# Patient Record
Sex: FEMALE | Race: BLACK OR AFRICAN AMERICAN | NOT HISPANIC OR LATINO | Employment: UNEMPLOYED | URBAN - METROPOLITAN AREA
[De-identification: names, ages, dates, MRNs, and addresses within clinical notes are randomized per-mention and may not be internally consistent; named-entity substitution may affect disease eponyms.]

---

## 2017-01-18 ENCOUNTER — HISTORICAL (OUTPATIENT)
Dept: ADMINISTRATIVE | Facility: HOSPITAL | Age: 47
End: 2017-01-18

## 2018-12-17 ENCOUNTER — HISTORICAL (OUTPATIENT)
Dept: GASTROENTEROLOGY | Facility: CLINIC | Age: 48
End: 2018-12-17

## 2018-12-17 LAB
ABS NEUT (OLG): 2.09 X10(3)/MCL (ref 2.1–9.2)
ALBUMIN SERPL-MCNC: 4 GM/DL (ref 3.4–5)
ALBUMIN/GLOB SERPL: 1 RATIO (ref 1–2)
ALP SERPL-CCNC: 141 UNIT/L (ref 45–117)
ALT SERPL-CCNC: 141 UNIT/L (ref 12–78)
AST SERPL-CCNC: 57 UNIT/L (ref 15–37)
BASOPHILS # BLD AUTO: 0.01 X10(3)/MCL
BASOPHILS NFR BLD AUTO: 0 %
BILIRUB SERPL-MCNC: 0.6 MG/DL (ref 0.2–1)
BILIRUBIN DIRECT+TOT PNL SERPL-MCNC: 0.2 MG/DL
BILIRUBIN DIRECT+TOT PNL SERPL-MCNC: 0.4 MG/DL
BUN SERPL-MCNC: 15 MG/DL (ref 7–18)
CALCIUM SERPL-MCNC: 9.3 MG/DL (ref 8.5–10.1)
CHLORIDE SERPL-SCNC: 101 MMOL/L (ref 98–107)
CO2 SERPL-SCNC: 32 MMOL/L (ref 21–32)
CREAT SERPL-MCNC: 0.9 MG/DL (ref 0.6–1.3)
EOSINOPHIL # BLD AUTO: 0.11 10*3/UL
EOSINOPHIL NFR BLD AUTO: 2 %
ERYTHROCYTE [DISTWIDTH] IN BLOOD BY AUTOMATED COUNT: 11.6 % (ref 11.5–14.5)
GLOBULIN SER-MCNC: 3.6 GM/ML (ref 2.3–3.5)
GLUCOSE SERPL-MCNC: 335 MG/DL (ref 74–106)
HCT VFR BLD AUTO: 40.6 % (ref 35–46)
HGB BLD-MCNC: 13.4 GM/DL (ref 12–16)
LYMPHOCYTES # BLD AUTO: 1.91 X10(3)/MCL
LYMPHOCYTES NFR BLD AUTO: 42 % (ref 13–40)
MCH RBC QN AUTO: 31.8 PG (ref 26–34)
MCHC RBC AUTO-ENTMCNC: 33 GM/DL (ref 31–37)
MCV RBC AUTO: 96.2 FL (ref 80–100)
MONOCYTES # BLD AUTO: 0.39 X10(3)/MCL
MONOCYTES NFR BLD AUTO: 9 % (ref 4–12)
NEUTROPHILS # BLD AUTO: 2.09 X10(3)/MCL
NEUTROPHILS NFR BLD AUTO: 46 X10(3)/MCL
PLATELET # BLD AUTO: 203 X10(3)/MCL (ref 130–400)
PMV BLD AUTO: 9.7 FL (ref 7.4–10.4)
POTASSIUM SERPL-SCNC: 4 MMOL/L (ref 3.5–5.1)
PREALB SERPL-MCNC: 22.9 MG/DL (ref 20–40)
PROT SERPL-MCNC: 7.6 GM/DL (ref 6.4–8.2)
RBC # BLD AUTO: 4.22 X10(6)/MCL (ref 4–5.2)
SODIUM SERPL-SCNC: 137 MMOL/L (ref 136–145)
WBC # SPEC AUTO: 4.5 X10(3)/MCL (ref 4.5–11)

## 2019-01-02 ENCOUNTER — HISTORICAL (OUTPATIENT)
Dept: ENDOSCOPY | Facility: HOSPITAL | Age: 49
End: 2019-01-02

## 2022-04-07 ENCOUNTER — HISTORICAL (OUTPATIENT)
Dept: ADMINISTRATIVE | Facility: HOSPITAL | Age: 52
End: 2022-04-07

## 2022-04-23 VITALS
WEIGHT: 115.06 LBS | SYSTOLIC BLOOD PRESSURE: 130 MMHG | BODY MASS INDEX: 19.64 KG/M2 | DIASTOLIC BLOOD PRESSURE: 88 MMHG | HEIGHT: 64 IN

## 2022-05-01 NOTE — HISTORICAL [facility]
This is a historical note converted from Rajinder. Formatting and pictures may have been removed.  Please reference Rajinder for original formatting and attached multimedia. History of Present Illness  48F with recent weight loss ~60lbs over the last 1 year with associated nausea and decreased appetite. She denies any difficulties with daily NSAID use, swallowing food, emesis or regurgitation, changes in bowel habits, blood in her stools or dark stools. She has a surgical history of  x2, hysterectomy, hernia repair, gallbladder removal, treatment of bleeding gastric ulcer. She denies any tobacco or alcohol use.  Review of Systems  10 point ROS is negative if not otherwise stated in the HPI  Physical Exam  Vitals & Measurements  T:?37? ?C (Oral)? HR:?84(Monitored)? RR:?20? BP:?145/95? SpO2:?100%? WT:?53.3?kg?  NAD, A&Ox3  No inc WOB, no chest wall tenderness, equal rise and fall  Abd soft, non distended, non tender, no masses  Ext with palpable distal pulses, 5/5 strength and full ROM  Assessment/Plan  48F with unexplained weight loss  ?  -EGD/Colonoscopy today, risks and benefits discussed with patient  ?  Nick Jere PGY-2  General Surgery   Problem List/Past Medical History  Ongoing  Abnormal uterine bleeding (AUB)  DM (diabetes mellitus)  HPV in female  HTN (hypertension)  Historical  Anxiety  Depression  Depression  Diabetes  H/O gastric ulcer  Hiatal hernia  HTN (hypertension)  Mitral valve prolapse  Medications  Inpatient  Lidocaine Viscous 2% mucous membrane solution, 15 mL/EA, N/A, Once  [id number] 1,000 mL, 1000 mL, IV  Home  ALPRAZOLAM 1 MG TABLET, 1 mg= 1 tab(s), Oral, TID  Amaryl 4 mg oral tablet, 4 mg= 1 tab(s), Oral, Daily  AMLODIPINE BESYLATE 10 MG TAB, 10 mg= 1 tab(s), Oral, Daily  Dok 100 Mg Softgel, 100 mg= 1 cap(s), Oral, BID  DULOXETINE HCL DR 60 MG CAP, 60 mg= 1 cap(s), Oral, Daily  metformin 500 mg oral tablet, 1000 mg= 2 tab(s), Oral, BID  TRAMADOL HCL 50 MG TABLET, 50 mg= 1 tab(s), Oral,  BID  traZODONE 150 mg oral tab ( Desyrel ), 150 mg= 1 tab(s), Oral, qPM  Allergies  BuSpar  TEGretol  codeine?(hives)  Social History  Alcohol - Denies Alcohol Use, 10/13/2014  Employment/School  applying for disability, Highest education level: High school. Operates hazardous equipment: No., 02/28/2015  Exercise  Self assessment: Poor condition., 02/28/2015  Home/Environment  Lives with Children. Living situation: Home/Independent. Home equipment: Glucose monitoring. Alcohol abuse in household: No. Substance abuse in household: No. Smoker in household: No. Injuries/Abuse/Neglect in household: No. Feels unsafe at home: No. Family/Friends available for support: Yes. Concern for family members at home: No. Financial concerns: Yes. TV/Computer concerns: No., 02/28/2015  Nutrition/Health  Regular, Caffeine intake amount: mostly caffeine free. Wants to lose weight: Yes. Sleeping concerns: Yes. Feels highly stressed: Yes., 02/28/2015  Sexual  Substance Abuse - Denies Substance Abuse, 10/13/2014  Current, Prescription medications, Daily, Previous treatment: None. IV drug use: Yes. Drug use interferes with work/home: Yes. Ready to change: Yes. Household substance abuse concerns: No., 01/13/2015  Tobacco - Denies Tobacco Use, 10/13/2014  Never (less than 100 in lifetime), N/A, 01/02/2019  Family History  Colon cancer.: Grandmother.  DM (diabetes mellitus): Mother.  HTN (hypertension).: Mother and Father.  Tobacco user: Mother and Father.Negative: Brother.      Seen in clinic for weight loss and elevated Ca 19-9.? CT with contrast unrevealing.? Pancreas looked ok.? EGD and colonoscopy planned to evaluate for other sources of weight loss.? her weight more recently has been stable.? She still has a fair appetite.

## 2022-08-18 ENCOUNTER — HOSPITAL ENCOUNTER (EMERGENCY)
Facility: HOSPITAL | Age: 52
Discharge: HOME | End: 2022-08-18
Attending: EMERGENCY MEDICINE
Payer: MEDICAID

## 2022-08-18 VITALS
OXYGEN SATURATION: 98 % | RESPIRATION RATE: 18 BRPM | BODY MASS INDEX: 27.83 KG/M2 | HEART RATE: 94 BPM | TEMPERATURE: 99 F | HEIGHT: 64 IN | SYSTOLIC BLOOD PRESSURE: 140 MMHG | DIASTOLIC BLOOD PRESSURE: 82 MMHG | WEIGHT: 163 LBS

## 2022-08-18 DIAGNOSIS — R20.2 PARESTHESIAS IN RIGHT HAND: ICD-10-CM

## 2022-08-18 DIAGNOSIS — D70.8 OTHER NEUTROPENIA: Primary | ICD-10-CM

## 2022-08-18 DIAGNOSIS — M79.601 BILATERAL ARM PAIN: ICD-10-CM

## 2022-08-18 DIAGNOSIS — M79.602 BILATERAL ARM PAIN: ICD-10-CM

## 2022-08-18 DIAGNOSIS — R29.898 UPPER EXTREMITY WEAKNESS: ICD-10-CM

## 2022-08-18 DIAGNOSIS — D70.9 NEUTROPENIA, UNSPECIFIED TYPE: ICD-10-CM

## 2022-08-18 LAB
ABS NEUT CALC (OHS): 1.04 X10(3)/MCL (ref 2.1–9.2)
ALBUMIN SERPL-MCNC: 4.4 GM/DL (ref 3.5–5)
ALBUMIN/GLOB SERPL: 1.4 RATIO (ref 1.1–2)
ALP SERPL-CCNC: 66 UNIT/L (ref 40–150)
ALT SERPL-CCNC: 29 UNIT/L (ref 0–55)
AST SERPL-CCNC: 31 UNIT/L (ref 5–34)
BILIRUBIN DIRECT+TOT PNL SERPL-MCNC: 0.6 MG/DL
BUN SERPL-MCNC: 8.6 MG/DL (ref 9.8–20.1)
CALCIUM SERPL-MCNC: 9.8 MG/DL (ref 8.4–10.2)
CHLORIDE SERPL-SCNC: 104 MMOL/L (ref 98–107)
CO2 SERPL-SCNC: 28 MMOL/L (ref 22–29)
CREAT SERPL-MCNC: 0.73 MG/DL (ref 0.55–1.02)
CRP SERPL HS-MCNC: <0.3 MG/L
ERYTHROCYTE [DISTWIDTH] IN BLOOD BY AUTOMATED COUNT: 12.1 % (ref 11.5–17)
ERYTHROCYTE [SEDIMENTATION RATE] IN BLOOD: 6 MM/HR (ref 0–20)
FLUAV AG UPPER RESP QL IA.RAPID: NOT DETECTED
FLUBV AG UPPER RESP QL IA.RAPID: NOT DETECTED
GFR SERPLBLD CREATININE-BSD FMLA CKD-EPI: >60 MLS/MIN/1.73/M2
GLOBULIN SER-MCNC: 3.1 GM/DL (ref 2.4–3.5)
GLUCOSE SERPL-MCNC: 87 MG/DL (ref 74–100)
HCT VFR BLD AUTO: 38.5 % (ref 37–47)
HGB BLD-MCNC: 12.5 GM/DL (ref 12–16)
HIV 1+2 AB+HIV1 P24 AG SERPL QL IA: NONREACTIVE
HYPOCHROMIA BLD QL SMEAR: ABNORMAL
IMM GRANULOCYTES # BLD AUTO: 0.01 X10(3)/MCL (ref 0–0.04)
IMM GRANULOCYTES NFR BLD AUTO: 0.3 %
LYMPHOCYTES NFR BLD MANUAL: 2.4 X10(3)/MCL
LYMPHOCYTES NFR BLD MANUAL: 60 % (ref 13–40)
MCH RBC QN AUTO: 30.9 PG (ref 27–31)
MCHC RBC AUTO-ENTMCNC: 32.5 MG/DL (ref 33–36)
MCV RBC AUTO: 95.1 FL (ref 80–94)
MONOCYTES NFR BLD MANUAL: 0.56 X10(3)/MCL (ref 0.1–1.3)
MONOCYTES NFR BLD MANUAL: 14 % (ref 2–11)
NEUTROPHILS NFR BLD MANUAL: 26 % (ref 47–80)
NRBC BLD AUTO-RTO: 0 %
PLATELET # BLD AUTO: 193 X10(3)/MCL (ref 130–400)
PLATELET # BLD EST: ADEQUATE 10*3/UL
PMV BLD AUTO: 10.1 FL (ref 7.4–10.4)
POTASSIUM SERPL-SCNC: 3.3 MMOL/L (ref 3.5–5.1)
PROT SERPL-MCNC: 7.5 GM/DL (ref 6.4–8.3)
RBC # BLD AUTO: 4.05 X10(6)/MCL (ref 4.2–5.4)
RBC MORPH BLD: ABNORMAL
SARS-COV-2 RNA RESP QL NAA+PROBE: NOT DETECTED
SODIUM SERPL-SCNC: 142 MMOL/L (ref 136–145)
WBC # SPEC AUTO: 4 X10(3)/MCL (ref 4.5–11.5)

## 2022-08-18 PROCEDURE — 99285 EMERGENCY DEPT VISIT HI MDM: CPT | Mod: 25

## 2022-08-18 PROCEDURE — 86141 C-REACTIVE PROTEIN HS: CPT | Performed by: EMERGENCY MEDICINE

## 2022-08-18 PROCEDURE — 87389 HIV-1 AG W/HIV-1&-2 AB AG IA: CPT | Performed by: EMERGENCY MEDICINE

## 2022-08-18 PROCEDURE — 85027 COMPLETE CBC AUTOMATED: CPT | Performed by: EMERGENCY MEDICINE

## 2022-08-18 PROCEDURE — 85651 RBC SED RATE NONAUTOMATED: CPT | Performed by: EMERGENCY MEDICINE

## 2022-08-18 PROCEDURE — 36415 COLL VENOUS BLD VENIPUNCTURE: CPT | Performed by: EMERGENCY MEDICINE

## 2022-08-18 PROCEDURE — 63600175 PHARM REV CODE 636 W HCPCS: Performed by: EMERGENCY MEDICINE

## 2022-08-18 PROCEDURE — 80053 COMPREHEN METABOLIC PANEL: CPT | Performed by: EMERGENCY MEDICINE

## 2022-08-18 PROCEDURE — 87636 SARSCOV2 & INF A&B AMP PRB: CPT | Performed by: EMERGENCY MEDICINE

## 2022-08-18 PROCEDURE — 96372 THER/PROPH/DIAG INJ SC/IM: CPT | Performed by: EMERGENCY MEDICINE

## 2022-08-18 RX ORDER — TRAMADOL HYDROCHLORIDE 50 MG/1
50 TABLET ORAL EVERY 6 HOURS PRN
Qty: 20 TABLET | Refills: 0 | Status: SHIPPED | OUTPATIENT
Start: 2022-08-18

## 2022-08-18 RX ORDER — METHYLPREDNISOLONE SOD SUCC 125 MG
125 VIAL (EA) INJECTION
Status: COMPLETED | OUTPATIENT
Start: 2022-08-18 | End: 2022-08-18

## 2022-08-18 RX ORDER — LORAZEPAM 2 MG/ML
2 INJECTION INTRAMUSCULAR
Status: COMPLETED | OUTPATIENT
Start: 2022-08-18 | End: 2022-08-18

## 2022-08-18 RX ADMIN — LORAZEPAM 2 MG: 2 INJECTION INTRAMUSCULAR; INTRAVENOUS at 02:08

## 2022-08-18 RX ADMIN — METHYLPREDNISOLONE SODIUM SUCCINATE 125 MG: 125 INJECTION, POWDER, FOR SOLUTION INTRAMUSCULAR; INTRAVENOUS at 04:08

## 2022-08-18 NOTE — Clinical Note
"Silvia"Anselmo Rizzo was seen and treated in our emergency department on 8/18/2022.  She may return to work on 08/22/2022.       If you have any questions or concerns, please don't hesitate to call.      Yady Martinez MD"

## 2022-08-18 NOTE — ED PROVIDER NOTES
Encounter Date: 8/18/2022       History     Chief Complaint   Patient presents with    Arm Pain     Pt to er c/o pain to arms and hands, along with numbness and tingling. Pt also c/o shoulder pain.     52-year-old female complains of right arm pain which started 5 days ago with shocking pain going down her arm, and weakness to her .  Yesterday she started having similar pain and weakness on the left side.  She has no known injury, no history of any connective tissue disorder.  She has a history of anxiety, diabetes, hypertension, hyperlipidemia. She has never had this happen before.          Review of patient's allergies indicates:   Allergen Reactions    Buspirone     Carbamazepine     Codeine      Other reaction(s): hives     Past Medical History:   Diagnosis Date    Diabetes mellitus     Hyperlipidemia     Hypertension      History reviewed. No pertinent surgical history.  History reviewed. No pertinent family history.  Social History     Tobacco Use    Smoking status: Never Smoker    Smokeless tobacco: Never Used   Substance Use Topics    Drug use: Never     Review of Systems   Musculoskeletal:        Bilateral arm pain and  weakness   All other systems reviewed and are negative.      Physical Exam     Initial Vitals [08/18/22 1137]   BP Pulse Resp Temp SpO2   (!) 148/103 94 18 98.8 °F (37.1 °C) 99 %      MAP       --         Physical Exam    Nursing note and vitals reviewed.  Constitutional: She appears well-developed and well-nourished. She is not diaphoretic. No distress.   HENT:   Head: Normocephalic and atraumatic.   Mouth/Throat: Oropharynx is clear and moist.   Eyes: Conjunctivae are normal. Pupils are equal, round, and reactive to light.   Neck: Neck supple.   Cardiovascular: Normal rate, regular rhythm, normal heart sounds and intact distal pulses.   Pulmonary/Chest: Breath sounds normal. No respiratory distress. She has no wheezes. She has no rhonchi. She has no rales.   Abdominal:  Abdomen is soft. Bowel sounds are normal. She exhibits no distension. There is no abdominal tenderness. There is no guarding.   Musculoskeletal:         General: No tenderness or edema. Normal range of motion.      Cervical back: Neck supple.     Neurological: She is alert and oriented to person, place, and time.   Ambulatory without assistance, mentation normal, speech is clear, extraocular movements intact, pupils equal round reactive to light, cranial nerves intact.  No nystagmus.  She has full range of motion of her neck with no change in her arm symptoms, full range of motion of her shoulders elbows and wrist with no change in arm symptoms.  She complains of intermittent shooting pain into her arms.  She has full strength of her deltoid bicep and tricep but decreased rigor  strength bilaterally, right greater than left.  Radial pulses 2+, brisk capillary refill to the fingers.  Decreased subjective sensation to the right arm.   Skin: Skin is warm and dry. Capillary refill takes less than 2 seconds. No rash noted.   Psychiatric: She has a normal mood and affect. Thought content normal.         ED Course   Procedures  Labs Reviewed   CBC WITH DIFFERENTIAL - Abnormal; Notable for the following components:       Result Value    WBC 4.0 (*)     RBC 4.05 (*)     MCV 95.1 (*)     MCHC 32.5 (*)     All other components within normal limits   COMPREHENSIVE METABOLIC PANEL - Abnormal; Notable for the following components:    Potassium Level 3.3 (*)     Blood Urea Nitrogen 8.6 (*)     All other components within normal limits   MANUAL DIFFERENTIAL - Abnormal; Notable for the following components:    Neut Man 26 (*)     Lymph Man 60 (*)     Monocyte Man 14 (*)     Abs Neut calc 1.04 (*)     RBC Morph Abnormal (*)     Hypochrom 1+ (*)     All other components within normal limits   HIGH SENSITIVITY CRP - Normal   SEDIMENTATION RATE, AUTOMATED - Normal   COVID/FLU A&B PCR - Normal   PATHOLOGIST INTERPRETATION   HIV 1 / 2  ANTIBODY          Imaging Results          MRI Cervical Spine Without Contrast (Final result)  Result time 08/18/22 15:16:35    Final result by Brent Blount MD (08/18/22 15:16:35)                 Impression:      Cervical spine degenerative disc disease and spondylosis level by level discussed above.      Electronically signed by: Brent Blount  Date:    08/18/2022  Time:    15:16             Narrative:    EXAMINATION:  MRI CERVICAL SPINE WITHOUT CONTRAST    CLINICAL HISTORY:  Arm weakness and pain;    TECHNIQUE:  Multiple MRI pulse sequences were performed of the cervical spine without contrast.    COMPARISON:  CT cervical spine August 18, 2022    FINDINGS:  There is chronic loss of stature of the superior and the inferior endplates of C5 and the inferior endplate of C6 secondary to Schmorl's node defects.  Otherwise, cervical vertebrae stature is preserved and alignment is unremarkable.   No acute marrow edematous signal.  There is no prevertebral soft tissue prominence.  There are minimal indentations upon the ventral thecal sac by disc pathology and spondylosis without cord compression.  There is no cord edema or myelomalacia. Disc segmental is given below:    At C2-C3, disc is unremarkable.  Central canal is not stenosed and there are no narrowings of the neural foramen.    AT C3-C4, disc height is preserved.  Central canal is not stenosed.  There are no narrowings of the neural foramen.    At C4-C5, there is disc bulge which indents the ventral thecal sac without cord compression.  Right neural foramen is unremarkable.  There is mild spondylotic narrowing of the left neural foramen.    At C5-C6, there is slight bulging of annulus fibrosis.  Cord is not compressed.  Moderate narrowing of the right neural foramen is caused by uncovertebral arthropathy.  The left neural foramen is unremarkable.    At C6-C7, there is minimal bulging of annulus fibrosis.  Central canal is not stenosed.  Right neural foramen is  unremarkable.  There is minimal spondylolytic narrowing of the left neural foramen.    At C7-T1, disc is unremarkable.  Central canal is not stenosed.  There are no narrowings of the neural foramen.                               CT Cervical Spine Without Contrast (Final result)  Result time 08/18/22 12:51:18    Final result by Carin Menchaca MD (08/18/22 12:51:18)                 Impression:      1. No fractures.    2. Ligament, spinal cord and/or vascular abnormalities cannot be excluded on the basis of this examination.      Electronically signed by: Carin Menchaca  Date:    08/18/2022  Time:    12:51             Narrative:    EXAMINATION:  CT CERVICAL SPINE WITHOUT CONTRAST    CLINICAL HISTORY:  arm weakness and shocking pain;    TECHNIQUE:  Axial CT images were obtained through the cervical region.    Coronal and sagittal reconstructions obtained from the axial data.    Automatic exposure control was utilized to limit radiation dose.    Contrast: None.    Radiation Dose:    Total DLP: 238 mGy*cm    COMPARISON:  None    FINDINGS:  Fractures: None.    Alignment: Shallow reversal the normal cervical lordosis centered at C5.  No subluxations.    Soft tissues: No abnormalities.    Degenerative changes:    No significant canal or foraminal narrowing.                                 Medications   lorazepam injection 2 mg (2 mg Intramuscular Given 8/18/22 1415)   methylPREDNISolone sodium succinate injection 125 mg (125 mg Intramuscular Given 8/18/22 1611)     Medical Decision Making:   Differential Diagnosis:   Minimal disc disease next cervical radiculopathy less likely is a diagnosis.  Patient may have fibromyalgia or other chronic pain syndrome.  I will give her short course of tramadol and a steroid shot until she can follow-up with her primary care provider.  WBC count of 4 is similar to prior WBC 3 years ago when it was 4.5 and she can follow up with her primary for further evaluation is needed                       Clinical Impression:   Final diagnoses:  [D70.8] Other neutropenia (Primary)  [R29.898] Upper extremity weakness  [R20.2] Paresthesias in right hand  [M79.601, M79.602] Bilateral arm pain  [D70.9] Neutropenia, unspecified type          ED Disposition Condition    Discharge Stable        ED Prescriptions     Medication Sig Dispense Start Date End Date Auth. Provider    traMADoL (ULTRAM) 50 mg tablet Take 1 tablet (50 mg total) by mouth every 6 (six) hours as needed for Pain. 20 tablet 8/18/2022  Yady Martinez MD        Follow-up Information     Follow up With Specialties Details Why Contact Info    Phil Betancur MD General Practice Schedule an appointment as soon as possible for a visit in 1 week  [address] [facility] [address]  [phone]             Yady Martinez MD  08/18/22 4037

## 2022-08-27 LAB — VIEW PATHOLOGY REPORT (RELIAPATH): NORMAL

## 2022-09-01 DIAGNOSIS — G62.9 NEUROPATHY: Primary | ICD-10-CM

## 2023-04-12 ENCOUNTER — TELEPHONE (OUTPATIENT)
Dept: FAMILY MEDICINE | Facility: CLINIC | Age: 53
End: 2023-04-12
Payer: MEDICAID

## 2023-04-12 DIAGNOSIS — M54.2 CERVICALGIA: Primary | ICD-10-CM

## 2023-04-12 NOTE — TELEPHONE ENCOUNTER
----- Message from Saritha Morejon LPN sent at 3/21/2023 12:36 PM CDT -----  Regarding: FW: appt wanted to [facility]    ----- Message -----  From: Sharee Demarco  Sent: 3/21/2023  12:31 PM CDT  To: [facility] Staff  Subject: appt wanted to [facility]                          Type:  Needs Medical Advice    Who Called: Silvia Matthias  Would the patient rather a call back or a response via [facility]? Call back  Best Call Back Number: [phone]  Additional Information: she called to schedule a New patient appt with Dr. Vick Tamez.  Please give her a call to schedule.           
18

## 2023-04-13 ENCOUNTER — CLINICAL SUPPORT (OUTPATIENT)
Dept: REHABILITATION | Facility: HOSPITAL | Age: 53
End: 2023-04-13
Payer: MEDICAID

## 2023-04-13 DIAGNOSIS — M54.2 CERVICALGIA: Primary | ICD-10-CM

## 2023-04-13 PROCEDURE — 97163 PT EVAL HIGH COMPLEX 45 MIN: CPT
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
## 2023-04-18 ENCOUNTER — CLINICAL SUPPORT (OUTPATIENT)
Dept: REHABILITATION | Facility: HOSPITAL | Age: 53
End: 2023-04-18
Payer: MEDICAID

## 2023-04-18 DIAGNOSIS — M54.2 CERVICALGIA: Primary | ICD-10-CM

## 2023-04-18 DIAGNOSIS — Z74.09 IMPAIRED FUNCTIONAL MOBILITY AND ACTIVITY TOLERANCE: ICD-10-CM

## 2023-04-18 PROCEDURE — 97140 MANUAL THERAPY 1/> REGIONS: CPT

## 2023-04-18 PROCEDURE — 97110 THERAPEUTIC EXERCISES: CPT

## 2023-04-18 PROCEDURE — 20561 NDL INSJ W/O NJX 3+ MUSC: CPT

## 2023-04-18 PROCEDURE — 97032 APPL MODALITY 1+ESTIM EA 15: CPT

## 2023-04-18 NOTE — PLAN OF CARE
Physical Therapy Treatment Note     Name: Silvia Rizzo  Clinic Number: [id number]    Therapy Diagnosis:   Encounter Diagnoses   Name Primary?    Cervicalgia Yes    Impaired functional mobility and activity tolerance      Physician: KELLEY Ferguson MD    Visit Date: 4/18/2023    Physician Orders: PT Eval and Treat  Medical Diagnosis: Cervicalgia  Evaluation Date: 04/13/2023  Authorization Period Expiration: 07/12/2023  Plan of Care Certification Period: 07/13/2023  Visit #/Visits authorized: 1/ 12     Time In: 1007  Time Out: 1125  Total Billable Time: 78 minutes    Surgery: None  Orthopedic Precautions: None  Pertinent History: DM II uncontrolled    Subjective     Patient reports: Day of Evaluation she felt good but it was short-lived. She tried home stretches, only about 10 minutes of relief. She spoke with her counselor about dry needling and was told it gave good results, so she would like to try the dry needling today.    CMS Impairment/Limitation/Restriction for FOTO Survey  Therapist reviewed FOTO scores for Silvia Rizzo on 4/13/2023. FOTO documents entered into [employer] - see Media section.  Patient's Physical FS Primary Measure: 33  Risk Adjusted Statistical FOTO: 41  Limitation Score: 67%  Category: Carrying  NDI: 64.9% disability    Objective     Silvia received the following treatment:     Time Activities   Manual 19 min Passive cervical traction, suboccipital release, lateral cervical glides, STM cervical paraspinals       TherAct         TherEx 24 min MHP to neck for muscle relaxation and reduced hypertonicity, cold pack post session to reduce edema     Gait     Neuro Re-ed     Modalities     E-Stim 35 min Intra-muscular pulsed stimulation using ROCIO ES-130 unit and TDN (see below):    TDN done to bilateral neck using Seirin 0.30 needles (30mm, 40mm, 50mm). 30mm needle each to C3-C6 multifidi, 40mm needle x 2 each to suboccipitals, 50mm needle each to levator scap insertion @ superior scap angle.  Stim at 3Hz low frequency and 4-6 intensity for 7 min. Done in seated position with head flexed on pillow.           Home Exercises Provided and Patient Education Provided     Education provided:   -Plan of care, HEP    Assessment     She benefits from cervical traction as this helps to decompress and release tension. We attempted TDN this session, hopefully will have similar effect and be longer-lasting. Multiple trigger points throughout cervical paraspinals, suboccipitals, levator scap.    Patient prognosis is Good.      Anticipated barriers to physical therapy: None    Goals: Silvia Is progressing well towards her goals.  Short Term Goals: 6 weeks   Patient will report at least 10% disability reduction from initial NDI score to indicate clinically significant functional improvement  Patient will report at least 10 point increase on initial FOTO score to indicate clinically significant functional improvement  Patient will go through 1/2 day of ADLs with no radiating RUE pain/symptoms        Long Term Goals: 12 weeks   Patient will report at least 20% disability reduction from initial NDI score to indicate clinically significant functional improvement  Patient will report at least 20 point increase on initial FOTO score to indicate clinically significant functional improvement  Patient will go through full day of ADLs with no radiating RUE pain/symptoms    Plan     2-3x/week x 12 weeks    Willard Hatch, PT

## 2023-04-20 ENCOUNTER — CLINICAL SUPPORT (OUTPATIENT)
Dept: REHABILITATION | Facility: HOSPITAL | Age: 53
End: 2023-04-20
Payer: MEDICAID

## 2023-04-20 DIAGNOSIS — M54.2 CERVICALGIA: Primary | ICD-10-CM

## 2023-04-20 DIAGNOSIS — Z74.09 IMPAIRED FUNCTIONAL MOBILITY AND ACTIVITY TOLERANCE: ICD-10-CM

## 2023-04-20 PROCEDURE — 97112 NEUROMUSCULAR REEDUCATION: CPT

## 2023-04-20 PROCEDURE — 97530 THERAPEUTIC ACTIVITIES: CPT

## 2023-04-20 PROCEDURE — 97014 ELECTRIC STIMULATION THERAPY: CPT

## 2023-04-20 PROCEDURE — 97140 MANUAL THERAPY 1/> REGIONS: CPT

## 2023-04-20 NOTE — PLAN OF CARE
Physical Therapy Treatment Note     Name: Silvia Rizzo  Clinic Number: [id number]    Therapy Diagnosis:   Encounter Diagnoses   Name Primary?    Cervicalgia Yes    Impaired functional mobility and activity tolerance      Physician: KELLEY Ferguson MD    Visit Date: 4/20/2023    Physician Orders: PT Eval and Treat  Medical Diagnosis: Cervicalgia  Evaluation Date: 04/13/2023  Authorization Period Expiration: 07/12/2023  Plan of Care Certification Period: 07/13/2023  Visit #/Visits authorized: 2/ 12     Time In: 1010  Time Out: 1110  Total Billable Time: 60 minutes    Surgery: None  Orthopedic Precautions: None  Pertinent History: DM II uncontrolled    Subjective     Patient reports: 6/10 pain at current, some soreness from TDN. Has been doing home stretches and states they help. Overall has had significantly less occurrence and severity of numbness/tingling in her right hand.    CMS Impairment/Limitation/Restriction for FOTO Survey  Therapist reviewed FOTO scores for Silvia Rizzo on 4/13/2023. FOTO documents entered into [employer] - see Media section.  Patient's Physical FS Primary Measure: 33  Risk Adjusted Statistical FOTO: 41  Limitation Score: 67%  Category: Carrying  NDI: 64.9% disability    Objective     Silvia received the following treatment:     Time Activities   Manual 14 min Passive cervical traction, suboccipital release, STM cervical paraspinals, passive right neck stretch (UTs, mid scalenes, levator scap)       TherAct 21 min Education, MHP to neck for muscle relaxation and reduced hypertonicity     TherEx       Gait     Neuro Re-ed 13 min Russian stim to right scapular region (UTs, levator, RTC) - 5:5 on off cycle, 2 sec ramp up, 16 intensity       Modalities 12 min IFC to right scapular region (UTs, levator, RTC)       E-Stim  Intra-muscular pulsed stimulation using ROCIO ES-130 unit and TDN (see below):    TDN done to bilateral neck using Seirin 0.30 needles (30mm, 40mm, 50mm). 30mm needle each  to C3-C6 multifidi, 40mm needle x 2 each to suboccipitals, 50mm needle each to levator scap insertion @ superior scap angle. Stim at 3Hz low frequency and 4-6 intensity for 7 min. Done in seated position with head flexed on pillow.           Home Exercises Provided and Patient Education Provided     Education provided:   -Plan of care, HEP    Assessment     Overall she is showing improvements; even though post-session pain 8/10 this is still significantly better than her constant 10+/10 pain that she is used to constantly having and this is following irritation to scapular muscles that are pain spots. She is experiencing significantly less numbness/tingling in her right hand and down her right arm (both in severity and occurrence), and overall has less pain than she used to have.    She benefits from cervical traction, suboccipital release, and muscle stretching, as these help to decompress spine and release tension. TDN may have been too irritable for her, and suspect this led to expected increase in soreness. She does have less soreness through cervical paraspinals, however trigger points remain in right scapular region.    We incorporated neuromuscular stimulation to allow use of her scapular muscle group without compensatory cervical muscle use. Continue and alter based on presentation, however the plan is to address pain, with gradual incorporation of strengthening.    Patient prognosis is Good.      Anticipated barriers to physical therapy: None    Goals: Silvia Is progressing well towards her goals.  Short Term Goals: 6 weeks   Patient will report at least 10% disability reduction from initial NDI score to indicate clinically significant functional improvement  Patient will report at least 10 point increase on initial FOTO score to indicate clinically significant functional improvement  Patient will go through 1/2 day of ADLs with no radiating RUE pain/symptoms        Long Term Goals: 12 weeks   Patient will  report at least 20% disability reduction from initial NDI score to indicate clinically significant functional improvement  Patient will report at least 20 point increase on initial FOTO score to indicate clinically significant functional improvement  Patient will go through full day of ADLs with no radiating RUE pain/symptoms    Plan     2-3x/week x 12 weeks    Willard Hatch, PT

## 2023-04-25 ENCOUNTER — PATIENT MESSAGE (OUTPATIENT)
Dept: RESEARCH | Facility: HOSPITAL | Age: 53
End: 2023-04-25
Payer: MEDICAID

## 2023-04-25 ENCOUNTER — CLINICAL SUPPORT (OUTPATIENT)
Dept: REHABILITATION | Facility: HOSPITAL | Age: 53
End: 2023-04-25
Payer: MEDICAID

## 2023-04-25 DIAGNOSIS — M54.2 CERVICALGIA: Primary | ICD-10-CM

## 2023-04-25 DIAGNOSIS — Z74.09 IMPAIRED FUNCTIONAL MOBILITY AND ACTIVITY TOLERANCE: ICD-10-CM

## 2023-04-25 PROCEDURE — 97140 MANUAL THERAPY 1/> REGIONS: CPT

## 2023-04-25 PROCEDURE — 97110 THERAPEUTIC EXERCISES: CPT

## 2023-04-25 PROCEDURE — 97014 ELECTRIC STIMULATION THERAPY: CPT

## 2023-04-25 NOTE — PLAN OF CARE
Physical Therapy Treatment Note     Name: Silvia Rizzo  Clinic Number: [id number]    Therapy Diagnosis:   Encounter Diagnoses   Name Primary?    Cervicalgia Yes    Impaired functional mobility and activity tolerance      Physician: KELLEY Ferguson MD    Visit Date: 4/25/2023    Physician Orders: PT Eval and Treat  Medical Diagnosis: Cervicalgia  Evaluation Date: 04/13/2023  Authorization Period Expiration: 07/12/2023  Plan of Care Certification Period: 07/13/2023  Visit #/Visits authorized: 3/ 12     Time In: 1001  Time Out: 1055  Total Billable Time: 54 minutes    Surgery: None  Orthopedic Precautions: None  Pertinent History: DM II uncontrolled    Subjective     Patient reports: 6/10 pain at current, states she no longer has pain going down her arm but it is localized to her neck area.    CMS Impairment/Limitation/Restriction for FOTO Survey  Therapist reviewed FOTO scores for Silvia Rizzo on 4/13/2023. FOTO documents entered into [employer] - see Media section.  Patient's Physical FS Primary Measure: 33  Risk Adjusted Statistical FOTO: 41  Limitation Score: 67%  Category: Carrying  NDI: 64.9% disability    Objective     Silvia received the following treatment:     Time Activities   Manual 14 min Passive cervical traction, suboccipital release, passive right neck stretch (UTs, mid scalenes, levator scap), Biofreeze to neck/scapula bilaterally       TherAct  Education, MHP to neck for muscle relaxation and reduced hypertonicity     TherEx 28 min Band right shoulder (ext, ER, diagonal downward horz abd), shld depression isometric press down, russian stim to right scapular region (UTs, levator, supraspinatus, infraspinatus - 5:5 with 2 sec ramp up and 16 intensity high)       Neuro Re-ed  Russian stim to right scapular region (UTs, levator, RTC) - 5:5 on off cycle, 2 sec ramp up, 16 intensity       Modalities 12 min IFC to right scapular region (UTs, levator, RTC) with MHP       E-Stim             Home  Exercises Provided and Patient Education Provided     Education provided:   -Plan of care, HEP    Assessment     Overall she is showing improvements; less pain overall and she is having centralizing symptoms. She benefits from cervical traction, suboccipital release, and muscle stretching, as these help to decompress spine and release tension. She does have less soreness through cervical paraspinals, however trigger points remain in right scapular region.    We incorporated scapular stabilization exercises today, and there is the expectation that she will be sore from this. Continue and alter based on presentation, however the plan is to address pain, with gradual incorporation of strengthening.    Patient prognosis is Good.      Anticipated barriers to physical therapy: None    Goals: Silvia Is progressing well towards her goals.  Short Term Goals: 6 weeks   Patient will report at least 10% disability reduction from initial NDI score to indicate clinically significant functional improvement  Patient will report at least 10 point increase on initial FOTO score to indicate clinically significant functional improvement  Patient will go through 1/2 day of ADLs with no radiating RUE pain/symptoms        Long Term Goals: 12 weeks   Patient will report at least 20% disability reduction from initial NDI score to indicate clinically significant functional improvement  Patient will report at least 20 point increase on initial FOTO score to indicate clinically significant functional improvement  Patient will go through full day of ADLs with no radiating RUE pain/symptoms    Plan     2-3x/week x 12 weeks    Willard Hatch, PT

## 2023-05-02 ENCOUNTER — CLINICAL SUPPORT (OUTPATIENT)
Dept: REHABILITATION | Facility: HOSPITAL | Age: 53
End: 2023-05-02
Payer: MEDICAID

## 2023-05-02 DIAGNOSIS — M54.2 CERVICALGIA: Primary | ICD-10-CM

## 2023-05-02 DIAGNOSIS — Z74.09 IMPAIRED FUNCTIONAL MOBILITY AND ACTIVITY TOLERANCE: ICD-10-CM

## 2023-05-02 PROCEDURE — 97530 THERAPEUTIC ACTIVITIES: CPT

## 2023-05-02 PROCEDURE — 97140 MANUAL THERAPY 1/> REGIONS: CPT

## 2023-05-02 NOTE — PLAN OF CARE
Physical Therapy Treatment Note     Name: Silvia Rizzo  Clinic Number: [id number]    Therapy Diagnosis:   Encounter Diagnoses   Name Primary?    Cervicalgia Yes    Impaired functional mobility and activity tolerance      Physician: KELLEY Ferguson MD    Visit Date: 5/2/2023    Physician Orders: PT Eval and Treat  Medical Diagnosis: Cervicalgia  Evaluation Date: 04/13/2023  Authorization Period Expiration: 07/12/2023  Plan of Care Certification Period: 07/13/2023  Visit #/Visits authorized: 4/ 12     Time In: 1013  Time Out: 1058  Total Billable Time: 45 minutes    Surgery: None  Orthopedic Precautions: None  Pertinent History: DM II uncontrolled    Subjective     Patient reports: This morning she woke up with pain, and as she was readying to come to her session today the pain significantly worsened to the state in which it was before she started coming to PT. Teary and appears very saddened this morning.    CMS Impairment/Limitation/Restriction for FOTO Survey  Therapist reviewed FOTO scores for Silvia Rizzo on 4/13/2023. FOTO documents entered into [employer] - see Media section.  Patient's Physical FS Primary Measure: 33  Risk Adjusted Statistical FOTO: 41  Limitation Score: 67%  Category: Carrying  NDI: 64.9% disability    Objective     Silvia received the following treatment:     Time Activities   Manual 20 min Passive cervical traction, suboccipital release, STM to cervical muscles, Biofreeze to neck/scapula bilaterally       TherAct 25 min Education, MHP to neck for muscle relaxation and reduced hypertonicity, supine lying to for unweight her spine     TherEx  Band right shoulder (ext, ER, diagonal downward horz abd), shld depression isometric press down, russian stim to right scapular region (UTs, levator, supraspinatus, infraspinatus - 5:5 with 2 sec ramp up and 16 intensity high)       Neuro Re-ed  Russian stim to right scapular region (UTs, levator, RTC) - 5:5 on off cycle, 2 sec ramp up, 16  intensity       Modalities  IFC to right scapular region (UTs, levator, RTC) with MHP       E-Stim             Home Exercises Provided and Patient Education Provided     Education provided:   -Plan of care, HEP    Assessment     Mild pain relief from today's session.    Overall she was showing improvements; less pain overall and she is having centralizing symptoms. She benefits from cervical traction, suboccipital release, and muscle stretching, as these help to decompress spine and release tension. She does have less soreness through cervical paraspinals, however trigger points remain in right scapular region.    We incorporated scapular stabilization exercises today, and there is the expectation that she will be sore from this. Continue and alter based on presentation, however the plan is to address pain, with gradual incorporation of strengthening.    Patient prognosis is Good.      Anticipated barriers to physical therapy: None    Goals: Silvia Is progressing well towards her goals.  Short Term Goals: 6 weeks   Patient will report at least 10% disability reduction from initial NDI score to indicate clinically significant functional improvement  Patient will report at least 10 point increase on initial FOTO score to indicate clinically significant functional improvement  Patient will go through 1/2 day of ADLs with no radiating RUE pain/symptoms        Long Term Goals: 12 weeks   Patient will report at least 20% disability reduction from initial NDI score to indicate clinically significant functional improvement  Patient will report at least 20 point increase on initial FOTO score to indicate clinically significant functional improvement  Patient will go through full day of ADLs with no radiating RUE pain/symptoms    Plan     2-3x/week x 12 weeks    Willard Hatch, PT

## 2023-05-04 ENCOUNTER — CLINICAL SUPPORT (OUTPATIENT)
Dept: REHABILITATION | Facility: HOSPITAL | Age: 53
End: 2023-05-04
Payer: MEDICAID

## 2023-05-04 DIAGNOSIS — Z74.09 IMPAIRED FUNCTIONAL MOBILITY AND ACTIVITY TOLERANCE: ICD-10-CM

## 2023-05-04 DIAGNOSIS — M54.2 CERVICALGIA: Primary | ICD-10-CM

## 2023-05-04 PROCEDURE — 97110 THERAPEUTIC EXERCISES: CPT

## 2023-05-04 PROCEDURE — 97014 ELECTRIC STIMULATION THERAPY: CPT

## 2023-05-04 PROCEDURE — 97140 MANUAL THERAPY 1/> REGIONS: CPT

## 2023-05-04 NOTE — PLAN OF CARE
Physical Therapy Treatment Note     Name: Silvia Rizzo  Clinic Number: [id number]    Therapy Diagnosis:   Encounter Diagnoses   Name Primary?    Cervicalgia Yes    Impaired functional mobility and activity tolerance      Physician: KELLEY Ferguson MD    Visit Date: 5/4/2023    Physician Orders: PT Eval and Treat  Medical Diagnosis: Cervicalgia  Evaluation Date: 04/13/2023  Authorization Period Expiration: 07/12/2023  Plan of Care Certification Period: 07/13/2023  Visit #/Visits authorized: 5/ 12     Time In: 1012  Time Out: 1110  Total Billable Time: 58 minutes    Surgery: None  Orthopedic Precautions: None  Pertinent History: DM II uncontrolled    Subjective     Patient reports: Reports continued pain in the morning and coming to therapy. States it is at 8/10 now.     CMS Impairment/Limitation/Restriction for FOTO Survey  Therapist reviewed FOTO scores for Silvia Rizzo on 4/13/2023. FOTO documents entered into [employer] - see Media section.  Patient's Physical FS Primary Measure: 33  Risk Adjusted Statistical FOTO: 41  Limitation Score: 67%  Category: Carrying  NDI: 64.9% disability    Objective     Silvia received the following treatment:     Time Activities   Manual 18 min Passive cervical traction, suboccipital release, STM to cervical muscles, Biofreeze to neck/scapula bilaterally       TherAct 25 min Education, MHP to neck for muscle relaxation and reduced hypertonicity, supine lying to for unweight her spine  Chin tucks   TherEx  Band right shoulder (ext, ER, diagonal downward horz abd), shld depression isometric press down, russian stim to right scapular region (UTs, levator, supraspinatus, infraspinatus - 5:5 with 2 sec ramp up and 16 intensity high)       Neuro Re-ed  Russian stim to right scapular region (UTs, levator, RTC) - 5:5 on off cycle, 2 sec ramp up, 16 intensity       Modalities 15 IFC to right scapular region (UTs, levator, RTC) with MHP       E-Stim             Home Exercises Provided  and Patient Education Provided     Education provided:   -Plan of care, HEP    Assessment     Mild pain relief from today's session.    Overall she was showing improvements; less pain overall and she is having centralizing symptoms. She benefits from cervical traction, suboccipital release, and muscle stretching, as these help to decompress spine and release tension. She does have less soreness through cervical paraspinals, however trigger points remain in right scapular region and cervical spine.    Continue and alter based on presentation, however the plan is to address pain, with gradual incorporation of strengthening.    Patient prognosis is Good.      Anticipated barriers to physical therapy: None    Goals: Silvia Is progressing well towards her goals.  Short Term Goals: 6 weeks   Patient will report at least 10% disability reduction from initial NDI score to indicate clinically significant functional improvement  Patient will report at least 10 point increase on initial FOTO score to indicate clinically significant functional improvement  Patient will go through 1/2 day of ADLs with no radiating RUE pain/symptoms        Long Term Goals: 12 weeks   Patient will report at least 20% disability reduction from initial NDI score to indicate clinically significant functional improvement  Patient will report at least 20 point increase on initial FOTO score to indicate clinically significant functional improvement  Patient will go through full day of ADLs with no radiating RUE pain/symptoms    Plan     2-3x/week x 12 weeks    Jimbo Streeter, PT

## 2023-05-09 ENCOUNTER — PATIENT MESSAGE (OUTPATIENT)
Dept: RESEARCH | Facility: HOSPITAL | Age: 53
End: 2023-05-09
Payer: MEDICAID

## 2023-05-09 ENCOUNTER — CLINICAL SUPPORT (OUTPATIENT)
Dept: REHABILITATION | Facility: HOSPITAL | Age: 53
End: 2023-05-09
Payer: MEDICAID

## 2023-05-09 DIAGNOSIS — Z74.09 IMPAIRED FUNCTIONAL MOBILITY AND ACTIVITY TOLERANCE: ICD-10-CM

## 2023-05-09 DIAGNOSIS — M54.2 CERVICALGIA: Primary | ICD-10-CM

## 2023-05-09 PROCEDURE — 97110 THERAPEUTIC EXERCISES: CPT

## 2023-05-09 PROCEDURE — 97140 MANUAL THERAPY 1/> REGIONS: CPT

## 2023-05-09 NOTE — PLAN OF CARE
Physical Therapy Treatment Note     Name: Silvia Rizzo  Clinic Number: [id number]    Therapy Diagnosis:   Encounter Diagnoses   Name Primary?    Cervicalgia Yes    Impaired functional mobility and activity tolerance      Physician: KELLEY Ferguson MD    Visit Date: 5/9/2023    Physician Orders: PT Eval and Treat  Medical Diagnosis: Cervicalgia  Evaluation Date: 04/13/2023  Authorization Period Expiration: 07/12/2023  Plan of Care Certification Period: 07/13/2023  Visit #/Visits authorized: 6/ 12     Time In: 1014  Time Out: 1004  Total Billable Time: 50 minutes    Surgery: None  Orthopedic Precautions: None  Pertinent History: DM II uncontrolled    Subjective     Patient reports: This past weekend was pretty bad regarding neck pain but today feels better. States the chin tuck HEP exercise given seems to help.    CMS Impairment/Limitation/Restriction for FOTO Survey  Therapist reviewed FOTO scores for Silvia Rizzo on 4/13/2023. FOTO documents entered into [employer] - see Media section.  Patient's Physical FS Primary Measure: 33  Risk Adjusted Statistical FOTO: 41  Limitation Score: 67%  Category: Carrying  NDI: 64.9% disability    CMS Impairment/Limitation/Restriction for FOTO Survey  Therapist reviewed FOTO scores for Silvia Rizzo on 5/9/2023. FOTO documents entered into [employer] - see Media section.  Patient's Physical FS Primary Measure: 36  Risk Adjusted Statistical FOTO: 41  Limitation Score: 64%  Category: Carrying  NDI: 60.3% disability      Objective     Silvia received the following treatment:     Time Activities   Manual 20 min Passive cervical traction, suboccipital release, right stretch (UTs, LS), STM to cervical paraspinal muscles       TherAct  Education, MHP to neck for muscle relaxation and reduced hypertonicity, supine lying to for unweight her spine     TherEx 30 min NuStep, band shoulder (bilateral ext, bilateral ER, right D1 ext/horz abd, bilateral horz abd), shld depression isometric press  down, MHP to neck       Neuro Re-ed  Russian stim to right scapular region (UTs, levator, RTC) - 5:5 on off cycle, 2 sec ramp up, 16 intensity       Modalities  IFC to right scapular region (UTs, levator, RTC) with MHP       E-Stim             Home Exercises Provided and Patient Education Provided     Education provided:   -Plan of care, HEP    Assessment     Mild pain relief from today's session.    Overall she was showing improvements; less pain overall and she is having centralizing symptoms. She benefits from cervical traction, suboccipital release, and muscle stretching, as these help to decompress spine and release tension. She does have less soreness through cervical paraspinals, however trigger points remain in right scapular region.    We incorporated scapular stabilization exercises today, and there is the expectation that she will be sore from this. Continue and alter based on presentation, however the plan is to address pain, with gradual incorporation of strengthening.    Patient prognosis is Good.      Anticipated barriers to physical therapy: None    Goals: Silvia Is progressing well towards her goals.  Short Term Goals: 6 weeks   Patient will report at least 10% disability reduction from initial NDI score to indicate clinically significant functional improvement  Patient will report at least 10 point increase on initial FOTO score to indicate clinically significant functional improvement  Patient will go through 1/2 day of ADLs with no radiating RUE pain/symptoms        Long Term Goals: 12 weeks   Patient will report at least 20% disability reduction from initial NDI score to indicate clinically significant functional improvement  Patient will report at least 20 point increase on initial FOTO score to indicate clinically significant functional improvement  Patient will go through full day of ADLs with no radiating RUE pain/symptoms    Plan     2-3x/week x 12 weeks    Willard Hatch, PT

## 2023-05-11 ENCOUNTER — CLINICAL SUPPORT (OUTPATIENT)
Dept: REHABILITATION | Facility: HOSPITAL | Age: 53
End: 2023-05-11
Payer: MEDICAID

## 2023-05-11 DIAGNOSIS — M54.2 CERVICALGIA: Primary | ICD-10-CM

## 2023-05-11 DIAGNOSIS — Z74.09 IMPAIRED FUNCTIONAL MOBILITY AND ACTIVITY TOLERANCE: ICD-10-CM

## 2023-05-11 PROCEDURE — 97140 MANUAL THERAPY 1/> REGIONS: CPT

## 2023-05-11 PROCEDURE — 97110 THERAPEUTIC EXERCISES: CPT

## 2023-05-11 NOTE — PLAN OF CARE
Physical Therapy Treatment Note     Name: Silvia Rizzo  Clinic Number: [id number]    Therapy Diagnosis:   Encounter Diagnoses   Name Primary?    Cervicalgia Yes    Impaired functional mobility and activity tolerance      Physician: KELLEY Ferguson MD    Visit Date: 5/11/2023    Physician Orders: PT Eval and Treat  Medical Diagnosis: Cervicalgia  Evaluation Date: 04/13/2023  Authorization Period Expiration: 07/12/2023  Plan of Care Certification Period: 07/13/2023  Visit #/Visits authorized: 7/ 12     Time In: 1013  Time Out: 1058  Total Billable Time: 45 minutes    Surgery: None  Orthopedic Precautions: None  Pertinent History: DM II uncontrolled    Subjective     Patient reports:  felt better following previous session, states the exercises seem to have helped out to keep pain down.    CMS Impairment/Limitation/Restriction for FOTO Survey  Therapist reviewed FOTO scores for Silvia Rizzo on 4/13/2023. FOTO documents entered into EPIC - see Media section.  Patient's Physical FS Primary Measure: 33  Risk Adjusted Statistical FOTO: 41  Limitation Score: 67%  Category: Carrying  NDI: 64.9% disability    CMS Impairment/Limitation/Restriction for FOTO Survey  Therapist reviewed FOTO scores for Silvia Rizzo on 5/9/2023. FOTO documents entered into EPIC - see Media section.  Patient's Physical FS Primary Measure: 36  Risk Adjusted Statistical FOTO: 41  Limitation Score: 64%  Category: Carrying  NDI: 60.3% disability      Objective     Silvia received the following treatment:     Time Activities   Manual 14 min Passive cervical traction, suboccipital release, right stretch (UTs, LS), STM and Biofreeze to cervical paraspinal muscles       TherAct  Education, MHP to neck for muscle relaxation and reduced hypertonicity, supine lying to for unweight her spine     TherEx 31 min Band shoulder (bilateral ext, bilateral ER, right D1 ext/horz abd, bilateral horz abd, bilateral horz rows), shld depression isometric  swiss ball press down, MHP to neck, education       Neuro Re-ed  Russian stim to right scapular region (UTs, levator, RTC) - 5:5 on off cycle, 2 sec ramp up, 16 intensity       Modalities  IFC to right scapular region (UTs, levator, RTC) with MHP       E-Stim             Home Exercises Provided and Patient Education Provided     Education provided:   -Plan of care, HEP, ways that stress can contribute to mechanical pain    Assessment     Significant pain relief from today's session, and she is benefiting from scapular strengthening exercises. On top of that, cervical traction, suboccipital release, and muscle stretching tend to provide pain relief. Continues with less pain overall, and centralizing symptoms. She does have less soreness through cervical paraspinals, however trigger points remain in right scapular region. Continue based on presentation, however the plan is to address pain, with gradual strengthening progression.    Patient prognosis is Good.      Anticipated barriers to physical therapy: None    Goals: Silvia Is progressing well towards her goals.  Short Term Goals: 6 weeks   Patient will report at least 10% disability reduction from initial NDI score to indicate clinically significant functional improvement  Patient will report at least 10 point increase on initial FOTO score to indicate clinically significant functional improvement  Patient will go through 1/2 day of ADLs with no radiating RUE pain/symptoms        Long Term Goals: 12 weeks   Patient will report at least 20% disability reduction from initial NDI score to indicate clinically significant functional improvement  Patient will report at least 20 point increase on initial FOTO score to indicate clinically significant functional improvement  Patient will go through full day of ADLs with no radiating RUE pain/symptoms    Plan     2-3x/week x 12 weeks    Willard Hatch, PT

## 2023-05-16 ENCOUNTER — CLINICAL SUPPORT (OUTPATIENT)
Dept: REHABILITATION | Facility: HOSPITAL | Age: 53
End: 2023-05-16
Payer: MEDICAID

## 2023-05-16 DIAGNOSIS — M54.2 NECK PAIN: Primary | ICD-10-CM

## 2023-05-16 PROCEDURE — 97110 THERAPEUTIC EXERCISES: CPT

## 2023-05-16 PROCEDURE — 97140 MANUAL THERAPY 1/> REGIONS: CPT

## 2023-05-16 NOTE — PLAN OF CARE
Physical Therapy Treatment Note     Name: Silvia Rizzo  Clinic Number: [id number]    Therapy Diagnosis:   Encounter Diagnosis   Name Primary?    Neck pain Yes     Physician: KELLEY Ferguson MD    Visit Date: 5/16/2023    Physician Orders: PT Eval and Treat  Medical Diagnosis: Cervicalgia  Evaluation Date: 04/13/2023  Authorization Period Expiration: 07/12/2023  Plan of Care Certification Period: 07/13/2023  Visit #/Visits authorized: 8/ 12     Time In: 1015  Time Out: 1055  Total Billable Time: 40 minutes    Surgery: None  Orthopedic Precautions: None  Pertinent History: DM II uncontrolled    Subjective     Patient reports: Continues to feel better each session though reports 7/10 pain today. Feel exercises seem to have helped out to keep pain down and like theraband.     CMS Impairment/Limitation/Restriction for FOTO Survey  Therapist reviewed FOTO scores for Silvia Rizzo on 4/13/2023. FOTO documents entered into EPIC - see Media section.  Patient's Physical FS Primary Measure: 33  Risk Adjusted Statistical FOTO: 41  Limitation Score: 67%  Category: Carrying  NDI: 64.9% disability    CMS Impairment/Limitation/Restriction for FOTO Survey  Therapist reviewed FOTO scores for Silvia Rizzo on 5/9/2023. FOTO documents entered into EPIC - see Media section.  Patient's Physical FS Primary Measure: 36  Risk Adjusted Statistical FOTO: 41  Limitation Score: 64%  Category: Carrying  NDI: 60.3% disability      Objective     Silvia received the following treatment:     Time Activities   Manual 12 min Passive cervical traction, suboccipital release, right stretch (UTs, LS), STM and Biofreeze to cervical paraspinal muscles       TherAct  Education, MHP to neck for muscle relaxation and reduced hypertonicity, supine lying to for unweight her spine     TherEx 28 min Band shoulder (bilateral ext, bilateral ER, right D1 ext/horz abd, bilateral horz abd, bilateral horz rows), shld depression isometric swiss ball press  down, MHP to neck, education       Neuro Re-ed  Russian stim to right scapular region (UTs, levator, RTC) - 5:5 on off cycle, 2 sec ramp up, 16 intensity       Modalities  IFC to right scapular region (UTs, levator, RTC) with MHP       E-Stim             Home Exercises Provided and Patient Education Provided     Education provided:   -Plan of care, HEP, ways that stress can contribute to mechanical pain    Assessment     Continues with improved pain at end of session. Noted some continued trigger points in R cervical musculature. she is benefiting from scapular strengthening exercises. On top of that, cervical traction, suboccipital release, and muscle stretching tend to provide pain relief. Continues with less pain overall, and centralizing symptoms. More smiles and talkative during session. Plan is to address pain, with gradual strengthening progression.    Patient prognosis is Good.      Anticipated barriers to physical therapy: None    Goals: Silvia Is progressing well towards her goals.  Short Term Goals: 6 weeks   Patient will report at least 10% disability reduction from initial NDI score to indicate clinically significant functional improvement  Patient will report at least 10 point increase on initial FOTO score to indicate clinically significant functional improvement  Patient will go through 1/2 day of ADLs with no radiating RUE pain/symptoms        Long Term Goals: 12 weeks   Patient will report at least 20% disability reduction from initial NDI score to indicate clinically significant functional improvement  Patient will report at least 20 point increase on initial FOTO score to indicate clinically significant functional improvement  Patient will go through full day of ADLs with no radiating RUE pain/symptoms    Plan     2-3x/week x 12 weeks    Jimbo Streeter, PT

## 2023-05-23 ENCOUNTER — CLINICAL SUPPORT (OUTPATIENT)
Dept: REHABILITATION | Facility: HOSPITAL | Age: 53
End: 2023-05-23
Payer: MEDICAID

## 2023-05-23 DIAGNOSIS — M54.2 NECK PAIN: Primary | ICD-10-CM

## 2023-05-23 DIAGNOSIS — Z74.09 IMPAIRED FUNCTIONAL MOBILITY AND ACTIVITY TOLERANCE: ICD-10-CM

## 2023-05-23 PROCEDURE — 97140 MANUAL THERAPY 1/> REGIONS: CPT

## 2023-05-23 PROCEDURE — 97110 THERAPEUTIC EXERCISES: CPT

## 2023-05-23 NOTE — PLAN OF CARE
Physical Therapy Treatment Note     Name: Silvia Rizzo  Clinic Number: [id number]    Therapy Diagnosis:   No diagnosis found.    Physician: KELLEY Ferguson MD    Visit Date: 5/23/2023    Physician Orders: PT Eval and Treat  Medical Diagnosis: Cervicalgia  Evaluation Date: 04/13/2023  Authorization Period Expiration: 07/12/2023  Plan of Care Certification Period: 07/13/2023  Visit #/Visits authorized: 9/ 12     Time In: 1010  Time Out: 1120  Total Billable Time: 55 minutes    Surgery: None  Orthopedic Precautions: None  Pertinent History: DM II uncontrolled    Subjective     Patient reports: 6/10 pain at current.    CMS Impairment/Limitation/Restriction for FOTO Survey  Therapist reviewed FOTO scores for Silvia Rizzo on 4/13/2023. FOTO documents entered into EPIC - see Media section.  Patient's Physical FS Primary Measure: 33  Risk Adjusted Statistical FOTO: 41  Limitation Score: 67%  Category: Carrying  NDI: 64.9% disability    CMS Impairment/Limitation/Restriction for FOTO Survey  Therapist reviewed FOTO scores for Silvia Rizzo on 5/9/2023. FOTO documents entered into EPIC - see Media section.  Patient's Physical FS Primary Measure: 36  Risk Adjusted Statistical FOTO: 41  Limitation Score: 64%  Category: Carrying  NDI: 60.3% disability      Objective     Silvia received the following treatment:     Time Activities   Manual 14 min Passive cervical traction, suboccipital release, right stretch (UTs, LS), STM and Biofreeze to cervical paraspinal muscles       TherAct  Education, MHP to neck for muscle relaxation and reduced hypertonicity, supine lying to for unweight her spine     TherEx 41 min Band shoulder (bilateral ext, bilateral ER, right D1 ext/horz abd, bilateral horz abd, bilateral horz rows), shld depression isometric swiss ball press down, MHP to neck, education, supine lying to reduce dependent neck position       Neuro Re-ed  Russian stim to right scapular region (UTs, levator, RTC) - 5:5 on  off cycle, 2 sec ramp up, 16 intensity       Modalities  IFC to right scapular region (UTs, levator, RTC) with MHP       E-Stim             Home Exercises Provided and Patient Education Provided     Education provided:   -Plan of care, HEP, ways that stress can contribute to mechanical pain    Assessment     4/10 post session pain relief. She is benefiting from scapular strengthening exercises. On top of that, cervical traction (mild), suboccipital release, and muscle stretching tend to provide pain relief. Continues with less pain overall, and centralizing symptoms. She does have less soreness through cervical paraspinals, however trigger points remain in right scapular region. Continue based on presentation, however the plan is to address pain, with gradual strengthening progression.    Patient prognosis is Good.      Anticipated barriers to physical therapy: None    Goals: Silvia Is progressing well towards her goals.  Short Term Goals: 6 weeks   Patient will report at least 10% disability reduction from initial NDI score to indicate clinically significant functional improvement  Patient will report at least 10 point increase on initial FOTO score to indicate clinically significant functional improvement  Patient will go through 1/2 day of ADLs with no radiating RUE pain/symptoms        Long Term Goals: 12 weeks   Patient will report at least 20% disability reduction from initial NDI score to indicate clinically significant functional improvement  Patient will report at least 20 point increase on initial FOTO score to indicate clinically significant functional improvement  Patient will go through full day of ADLs with no radiating RUE pain/symptoms    Plan     2-3x/week x 12 weeks    Willard Hatch, PT

## 2023-05-25 ENCOUNTER — CLINICAL SUPPORT (OUTPATIENT)
Dept: REHABILITATION | Facility: HOSPITAL | Age: 53
End: 2023-05-25
Payer: MEDICAID

## 2023-05-25 DIAGNOSIS — Z74.09 IMPAIRED FUNCTIONAL MOBILITY AND ACTIVITY TOLERANCE: ICD-10-CM

## 2023-05-25 DIAGNOSIS — M54.2 NECK PAIN: Primary | ICD-10-CM

## 2023-05-25 PROCEDURE — 97112 NEUROMUSCULAR REEDUCATION: CPT

## 2023-05-25 PROCEDURE — 97014 ELECTRIC STIMULATION THERAPY: CPT

## 2023-05-25 NOTE — PLAN OF CARE
Physical Therapy Treatment Note     Name: Silvia Rizzo  Clinic Number: [id number]    Therapy Diagnosis:   Encounter Diagnoses   Name Primary?    Neck pain Yes    Impaired functional mobility and activity tolerance        Physician: KELLEY Ferguson MD    Visit Date: 5/25/2023    Physician Orders: PT Eval and Treat  Medical Diagnosis: Cervicalgia  Evaluation Date: 04/13/2023  Authorization Period Expiration: 07/12/2023  Plan of Care Certification Period: 07/13/2023  Visit #/Visits authorized: 10/ 12     Time In: 1015  Time Out: 1100  Total Billable Time: 45 minutes    Surgery: None  Orthopedic Precautions: None  Pertinent History: DM II uncontrolled    Subjective     Patient reports: 6/10 pain at current, no change since previous session. States we have been working on her neck but she has been having some right shoulder pain/soreness. We discussed that this is expected given origin/insertion points of cervical musculature, shoulder exercises done during PT, and relationship between scapula/cervical spine/GH joint.    CMS Impairment/Limitation/Restriction for FOTO Survey  Therapist reviewed FOTO scores for Silvia Rizzo on 4/13/2023. FOTO documents entered into EPIC - see Media section.  Patient's Physical FS Primary Measure: 33  Risk Adjusted Statistical FOTO: 41  Limitation Score: 67%  Category: Carrying  NDI: 64.9% disability    CMS Impairment/Limitation/Restriction for FOTO Survey  Therapist reviewed FOTO scores for Silvia Rizzo on 5/9/2023. FOTO documents entered into EPIC - see Media section.  Patient's Physical FS Primary Measure: 36  Risk Adjusted Statistical FOTO: 41  Limitation Score: 64%  Category: Carrying  NDI: 60.3% disability      Objective     Silvia received the following treatment:     Time Activities   Manual  Passive cervical traction, suboccipital release, right stretch (UTs, LS), STM and Biofreeze to cervical paraspinal muscles       TherAct 2 min Education, biofreeze to right  shoulder and scapula     TherEx  Band shoulder (bilateral ext, bilateral ER, right D1 ext/horz abd, bilateral horz abd, bilateral horz rows), shld depression isometric swiss ball press down, MHP to neck, education, supine lying to reduce dependent neck position       Neuro Re-ed 30 min Russian stim to right scapular region (UTs, levator, RTC, rhomboids) - 10:10 on off cycle, 2 sec ramp up, 19-28 intensity       Modalities 13 min IFC to right scapular region (UTs, levator, RTC, rhomboids) with MHP       E-Stim             Home Exercises Provided and Patient Education Provided     Education provided:   -Plan of care, HEP, therabands given 5.25.23 (one red, one green) along with verbal HEP (ER, horz abd)    Assessment     3/10 post session pain. Today we used e-stim for scapular muscle activation in hopes of activating scapular group without straining cervical muscle group, and this seemed to provide relief while still strengthening. Excessive cervical traction tends to increase pain in her neck, so we will reduce manual techniques and incorporate more active stretching should it be indicated.    She is benefiting from scapular strengthening exercises. She has less pain overall, and symptoms have centralized. She remains with expected soreness/pain throughout cervical region, and I feel that she would benefit from ongoing PT to address her pain, maximize her stability, and minimize her risk of recurring peripheral symptoms and worsening condition.    Patient prognosis is Good.      Anticipated barriers to physical therapy: None    Goals: Silvia Is progressing well towards her goals.  Short Term Goals: 6 weeks   Patient will report at least 10% disability reduction from initial NDI score to indicate clinically significant functional improvement  Patient will report at least 10 point increase on initial FOTO score to indicate clinically significant functional improvement  Patient will go through 1/2 day of ADLs with no  radiating RUE pain/symptoms        Long Term Goals: 12 weeks   Patient will report at least 20% disability reduction from initial NDI score to indicate clinically significant functional improvement  Patient will report at least 20 point increase on initial FOTO score to indicate clinically significant functional improvement  Patient will go through full day of ADLs with no radiating RUE pain/symptoms    Plan     2-3x/week x 12 weeks    Willard Hatch, PT

## 2023-06-01 ENCOUNTER — CLINICAL SUPPORT (OUTPATIENT)
Dept: REHABILITATION | Facility: HOSPITAL | Age: 53
End: 2023-06-01
Payer: MEDICAID

## 2023-06-01 DIAGNOSIS — Z74.09 IMPAIRED FUNCTIONAL MOBILITY AND ACTIVITY TOLERANCE: ICD-10-CM

## 2023-06-01 DIAGNOSIS — M54.2 NECK PAIN: Primary | ICD-10-CM

## 2023-06-01 PROCEDURE — 97140 MANUAL THERAPY 1/> REGIONS: CPT

## 2023-06-01 PROCEDURE — 97530 THERAPEUTIC ACTIVITIES: CPT

## 2023-06-01 NOTE — PLAN OF CARE
Physical Therapy Treatment Note     Name: Silvia Rizzo  Clinic Number: [id number]    Therapy Diagnosis:   Encounter Diagnoses   Name Primary?    Neck pain Yes    Impaired functional mobility and activity tolerance        Physician: KELLEY Ferguson MD    Visit Date: 6/1/2023    Physician Orders: PT Eval and Treat  Medical Diagnosis: Cervicalgia  Evaluation Date: 04/13/2023  Authorization Period Expiration: 07/12/2023  Plan of Care Certification Period: 07/13/2023  Visit #/Visits authorized: 11/ 12     Time In: 1015  Time Out: 1113  Total Billable Time: 58 minutes    Surgery: None  Orthopedic Precautions: None  Pertinent History: DM II uncontrolled    Subjective     Patient reports: 5/10 pain at current, has remained at moderate level of severity. Has some personal things going on with her family which are weighing on her. Has colonoscopy on 6/6/23      CMS Impairment/Limitation/Restriction for FOTO Survey  Therapist reviewed FOTO scores for Silvia Rizzo on 4/13/2023. FOTO documents entered into EPIC - see Media section.  Patient's Physical FS Primary Measure: 33  Risk Adjusted Statistical FOTO: 41  Limitation Score: 67%  Category: Carrying  NDI: 64.9% disability    CMS Impairment/Limitation/Restriction for FOTO Survey  Therapist reviewed FOTO scores for Silvia Rizzo on 5/9/2023. FOTO documents entered into EPIC - see Media section.  Patient's Physical FS Primary Measure: 36  Risk Adjusted Statistical FOTO: 41  Limitation Score: 64%  Category: Carrying  NDI: 60.3% disability      Objective     Silvia received the following treatment:     Time Activities   Manual 30 min Passive right stretch (UTs, scalenes, levator), STM and MFR to right neck/scapular muscles, Biofreeze to same area       TherAct 28 min NuStep, MHP with IFC to right (UT, LS, scpaula)     TherEx  Band shoulder (bilateral ext, bilateral ER, right D1 ext/horz abd, bilateral horz abd, bilateral horz rows), shld depression isometric swiss ball  press down, MHP to neck, education, supine lying to reduce dependent neck position       Neuro Re-ed  Russian stim to right scapular region (UTs, levator, RTC, rhomboids) - 10:10 on off cycle, 2 sec ramp up, 19-28 intensity       Modalities  IFC to right scapular region (UTs, levator, RTC, rhomboids) with MHP       E-Stim             Home Exercises Provided and Patient Education Provided     Education provided:   -Plan of care, HEP, therabands given 5.25.23 (one red, one green) along with verbal HEP (ER, horz abd)    Assessment     2/10 post session pain. Modalities with gentle manual therapy proving to do well for her. We previously used e-stim for scapular muscle activation in hopes of activating scapular group without straining cervical muscle group, and this seemed to provide relief while still strengthening. Excessive cervical traction tends to increase pain in her neck, so we will reduce manual techniques and incorporate more active stretching should it be indicated.    She is benefiting from scapular strengthening exercises. She has less pain overall, and symptoms have centralized. She remains with expected soreness/pain throughout cervical region, and I feel that she would benefit from ongoing PT to address her pain, maximize her stability, and minimize her risk of recurring peripheral symptoms and worsening condition.    Patient prognosis is Good.      Anticipated barriers to physical therapy: None    Goals: Silvia Is progressing well towards her goals.  Short Term Goals: 6 weeks   Patient will report at least 10% disability reduction from initial NDI score to indicate clinically significant functional improvement  Patient will report at least 10 point increase on initial FOTO score to indicate clinically significant functional improvement  Patient will go through 1/2 day of ADLs with no radiating RUE pain/symptoms        Long Term Goals: 12 weeks   Patient will report at least 20% disability reduction  from initial NDI score to indicate clinically significant functional improvement  Patient will report at least 20 point increase on initial FOTO score to indicate clinically significant functional improvement  Patient will go through full day of ADLs with no radiating RUE pain/symptoms    Plan     2-3x/week x 12 weeks    Willard Hatch, PT

## 2023-06-08 ENCOUNTER — CLINICAL SUPPORT (OUTPATIENT)
Dept: REHABILITATION | Facility: HOSPITAL | Age: 53
End: 2023-06-08
Payer: MEDICAID

## 2023-06-08 DIAGNOSIS — M54.2 NECK PAIN: Primary | ICD-10-CM

## 2023-06-08 DIAGNOSIS — Z74.09 IMPAIRED FUNCTIONAL MOBILITY AND ACTIVITY TOLERANCE: ICD-10-CM

## 2023-06-08 PROCEDURE — 97530 THERAPEUTIC ACTIVITIES: CPT

## 2023-06-08 PROCEDURE — 97112 NEUROMUSCULAR REEDUCATION: CPT

## 2023-06-08 NOTE — PLAN OF CARE
Physical Therapy Treatment Note     Name: Silvia Rizzo  Clinic Number: [id number]    Therapy Diagnosis:   Encounter Diagnoses   Name Primary?    Neck pain Yes    Impaired functional mobility and activity tolerance        Physician: KELLEY Ferguson MD    Visit Date: 6/8/2023    Physician Orders: PT Eval and Treat  Medical Diagnosis: Cervicalgia  Evaluation Date: 04/13/2023  Authorization Period Expiration: 07/12/2023  Plan of Care Certification Period: 07/13/2023  Visit #/Visits authorized: 12/ 12     Time In: 1018  Time Out: 1113  Total Billable Time: 55 minutes    Surgery: None  Orthopedic Precautions: None  Pertinent History: DM II uncontrolled    Subjective     Patient reports: 5/10 pain at current, has remained at moderate level of severity but overall has improved significantly since beginning PT. She would like to continue PT because she finds that she no longer has distal sensory symptoms and pain is now localized to her neck.      CMS Impairment/Limitation/Restriction for FOTO Survey  Therapist reviewed FOTO scores for Silvia Rizzo on 4/13/2023. FOTO documents entered into EPIC - see Media section.  Patient's Physical FS Primary Measure: 33  Risk Adjusted Statistical FOTO: 41  Limitation Score: 67%  Category: Carrying  NDI: 64.9% disability    Therapist reviewed FOTO scores for Silvia Rizzo on 5/9/2023. FOTO documents entered into EPIC - see Media section.  Patient's Physical FS Primary Measure: 36  Risk Adjusted Statistical FOTO: 41  Limitation Score: 64%  Category: Carrying  NDI: 60.3% disability      Therapist reviewed FOTO scores for Silvia Rizzo on 6/8/2023. FOTO documents entered into EPIC - see Media section.  Patient's Physical FS Primary Measure: 57  Risk Adjusted Statistical FOTO: 41  Limitation Score: 43%  Category: Carrying  NDI: 26% disability        Objective     Silvia received the following treatment:     Time Activities   Manual  Passive right stretch (UTs, amber,  levator), STM and MFR to right neck/scapular muscles, Biofreeze to same area       TherAct 28 min NuStep, MHP to neck     TherEx  Band shoulder (bilateral ext, bilateral ER, right D1 ext/horz abd, bilateral horz abd, bilateral horz rows), shld depression isometric swiss ball press down, MHP to neck, education, supine lying to reduce dependent neck position       Neuro Re-ed 27 min Russian stim to right scapular region (UTs, levator, RTC, rhomboids) - 5:5 on off cycle, 2 sec ramp up, 28 intensity       Modalities  IFC to right scapular region (UTs, levator, RTC, rhomboids) with MHP       E-Stim             Home Exercises Provided and Patient Education Provided     Education provided:   -Plan of care, HEP, therabands given 5.25.23 (one red, one green) along with verbal HEP (ER, horz abd)    Assessment     2/10 post session pain. Modalities with gentle manual therapy proving to do well for her. We previously used e-stim for scapular muscle activation in hopes of activating scapular group without straining cervical muscle group, and this seemed to provide relief while still strengthening. Excessive cervical traction tends to increase pain in her neck, so we will reduce manual techniques and incorporate more active stretching should it be indicated. I do feel that ongoing PT will be beneficial for her, but as adjunct she would likely do well with steroid injection as I believe her pathology to involve acute on chronic cervical joint exacerbations based on her symptoms. Her quality of life has improved (as seen by her FOTO score), and I believe she has room for improvement by way of progressive scapular strengthening and stabilization as tolerated in order to reduce excessive cervical strain, maximize mechanical efficiency, and minimize risk of return of peripheral symptoms.    Patient prognosis is Good.      Anticipated barriers to physical therapy: None    Goals: Silvia Is progressing well towards her goals.  Short  Term Goals: 6 weeks   Patient will report at least 10% disability reduction from initial NDI score to indicate clinically significant functional improvement (goal met)  Patient will report at least 10 point increase on initial FOTO score to indicate clinically significant functional improvement (goal met)  Patient will go through 1/2 day of ADLs with no radiating RUE pain/symptoms (progressing)        Long Term Goals: 12 weeks   Patient will report at least 20% disability reduction from initial NDI score to indicate clinically significant functional improvement (goal met)  Patient will report at least 20 point increase on initial FOTO score to indicate clinically significant functional improvement (goal met)  Patient will go through full day of ADLs with no radiating RUE pain/symptoms (progressing)    Plan     2-3x/week x 12 weeks    Willard Hatch, PT

## 2023-06-20 ENCOUNTER — CLINICAL SUPPORT (OUTPATIENT)
Dept: REHABILITATION | Facility: HOSPITAL | Age: 53
End: 2023-06-20
Payer: MEDICAID

## 2023-06-20 DIAGNOSIS — M54.2 NECK PAIN: Primary | ICD-10-CM

## 2023-06-20 DIAGNOSIS — Z74.09 IMPAIRED FUNCTIONAL MOBILITY AND ACTIVITY TOLERANCE: ICD-10-CM

## 2023-06-20 PROCEDURE — 97110 THERAPEUTIC EXERCISES: CPT

## 2023-06-20 PROCEDURE — 97112 NEUROMUSCULAR REEDUCATION: CPT

## 2023-06-20 NOTE — PLAN OF CARE
Physical Therapy Treatment Note     Name: Silvia Rizzo  Clinic Number: [id number]    Therapy Diagnosis:   Encounter Diagnoses   Name Primary?    Neck pain Yes    Impaired functional mobility and activity tolerance        Physician: KELLEY Ferguson MD    Visit Date: 6/20/2023    Physician Orders: PT Eval and Treat  Medical Diagnosis: Cervicalgia  Evaluation Date: 04/13/2023  Authorization Period Expiration: 07/12/2023  Plan of Care Certification Period: 07/13/2023  Visit #/Visits authorized: 13/ 16     Time In: 1013  Time Out: 1053  Total Billable Time: 40 minutes    Surgery: None  Orthopedic Precautions: None  Pertinent History: DM II uncontrolled    Subjective     Patient reports: No changes, has not had any increase in pain or decrease in pain.    CMS Impairment/Limitation/Restriction for FOTO Survey  Therapist reviewed FOTO scores for Silvia Rizzo on 4/13/2023. FOTO documents entered into EPIC - see Media section.  Patient's Physical FS Primary Measure: 33  Risk Adjusted Statistical FOTO: 41  Limitation Score: 67%  Category: Carrying  NDI: 64.9% disability    Therapist reviewed FOTO scores for Silvia Rizzo on 5/9/2023. FOTO documents entered into EPIC - see Media section.  Patient's Physical FS Primary Measure: 36  Risk Adjusted Statistical FOTO: 41  Limitation Score: 64%  Category: Carrying  NDI: 60.3% disability      Therapist reviewed FOTO scores for Silvia Rizzo on 6/8/2023. FOTO documents entered into EPIC - see Media section.  Patient's Physical FS Primary Measure: 57  Risk Adjusted Statistical FOTO: 41  Limitation Score: 43%  Category: Carrying  NDI: 26% disability        Objective     Silvia received the following treatment:     Time Activities   Manual  Passive right stretch (UTs, scalenes, levator), STM and MFR to right neck/scapular muscles, Biofreeze to same area       TherAct  NuStep, MHP to neck     TherEx 30 min Band shoulder (bilateral ext, bilateral ER, right D1 ext/horz abd,  bilateral horz abd, bilateral horz rows), shld depression isometric swiss ball press down, MHP to neck w/ Russian stim    NuStep     Neuro Re-ed 10 min Russian stim to right scapular region (UTs, levator, RTC, rhomboids) - 5:5 on off cycle, 2 sec ramp up, 21 intensity       Modalities  IFC to right scapular region (UTs, levator, RTC, rhomboids) with MHP       E-Stim             Home Exercises Provided and Patient Education Provided     Education provided:   -Plan of care, HEP, therabands given 5.25.23 (one red, one green) along with verbal HEP (ER, horz abd)    Assessment     Modalities with gentle manual therapy proving to do well for her. We previously used e-stim for scapular muscle activation in hopes of activating scapular group without straining cervical muscle group, and this seemed to provide relief while still strengthening. Excessive cervical traction tends to increase pain in her neck, so we will reduce manual techniques and incorporate more active stretching should it be indicated. I do feel that ongoing PT will be beneficial for her, but as adjunct she would likely do well with steroid injection as I believe her pathology to involve acute on chronic cervical joint exacerbations based on her symptoms. Her quality of life has improved (as seen by her FOTO score), and I believe she has room for improvement by way of progressive scapular strengthening and stabilization as tolerated in order to reduce excessive cervical strain, maximize mechanical efficiency, and minimize risk of return of peripheral symptoms.    Patient prognosis is Good.      Anticipated barriers to physical therapy: None    Goals: Silvia Is progressing well towards her goals.  Short Term Goals: 6 weeks   Patient will report at least 10% disability reduction from initial NDI score to indicate clinically significant functional improvement (goal met)  Patient will report at least 10 point increase on initial FOTO score to indicate  clinically significant functional improvement (goal met)  Patient will go through 1/2 day of ADLs with no radiating RUE pain/symptoms (progressing)        Long Term Goals: 12 weeks   Patient will report at least 20% disability reduction from initial NDI score to indicate clinically significant functional improvement (goal met)  Patient will report at least 20 point increase on initial FOTO score to indicate clinically significant functional improvement (goal met)  Patient will go through full day of ADLs with no radiating RUE pain/symptoms (progressing)    Plan     2-3x/week x 12 weeks    Willard Hatch, PT

## 2024-11-27 DIAGNOSIS — E11.69 TYPE 2 DIABETES MELLITUS WITH OTHER SPECIFIED COMPLICATION, UNSPECIFIED WHETHER LONG TERM INSULIN USE: Primary | ICD-10-CM

## 2025-05-26 ENCOUNTER — CLINICAL SUPPORT (OUTPATIENT)
Dept: ENDOCRINOLOGY | Facility: CLINIC | Age: 55
End: 2025-05-26
Payer: MEDICAID

## 2025-05-26 ENCOUNTER — OFFICE VISIT (OUTPATIENT)
Dept: ENDOCRINOLOGY | Facility: CLINIC | Age: 55
End: 2025-05-26
Payer: MEDICAID

## 2025-05-26 ENCOUNTER — LAB VISIT (OUTPATIENT)
Dept: LAB | Facility: HOSPITAL | Age: 55
End: 2025-05-26
Attending: NURSE PRACTITIONER
Payer: MEDICAID

## 2025-05-26 ENCOUNTER — RESULTS FOLLOW-UP (OUTPATIENT)
Dept: ENDOCRINOLOGY | Facility: CLINIC | Age: 55
End: 2025-05-26

## 2025-05-26 VITALS
WEIGHT: 147.69 LBS | TEMPERATURE: 98 F | RESPIRATION RATE: 14 BRPM | DIASTOLIC BLOOD PRESSURE: 80 MMHG | BODY MASS INDEX: 25.21 KG/M2 | HEART RATE: 69 BPM | SYSTOLIC BLOOD PRESSURE: 134 MMHG | HEIGHT: 64 IN

## 2025-05-26 DIAGNOSIS — Z79.4 TYPE 2 DIABETES MELLITUS WITH HYPERGLYCEMIA, WITH LONG-TERM CURRENT USE OF INSULIN: Primary | ICD-10-CM

## 2025-05-26 DIAGNOSIS — E11.65 TYPE 2 DIABETES MELLITUS WITH HYPERGLYCEMIA, WITH LONG-TERM CURRENT USE OF INSULIN: Primary | ICD-10-CM

## 2025-05-26 DIAGNOSIS — E11.3293 MILD NONPROLIFERATIVE DIABETIC RETINOPATHY OF BOTH EYES WITHOUT MACULAR EDEMA ASSOCIATED WITH TYPE 2 DIABETES MELLITUS: ICD-10-CM

## 2025-05-26 DIAGNOSIS — E11.69 TYPE 2 DIABETES MELLITUS WITH OTHER SPECIFIED COMPLICATION, UNSPECIFIED WHETHER LONG TERM INSULIN USE: ICD-10-CM

## 2025-05-26 DIAGNOSIS — E11.65 TYPE 2 DIABETES MELLITUS WITH HYPERGLYCEMIA, WITH LONG-TERM CURRENT USE OF INSULIN: ICD-10-CM

## 2025-05-26 DIAGNOSIS — Z79.4 TYPE 2 DIABETES MELLITUS WITH HYPERGLYCEMIA, WITH LONG-TERM CURRENT USE OF INSULIN: ICD-10-CM

## 2025-05-26 LAB
ALBUMIN SERPL-MCNC: 4.5 G/DL (ref 3.5–5)
BUN SERPL-MCNC: 10.1 MG/DL (ref 9.8–20.1)
CALCIUM SERPL-MCNC: 9.8 MG/DL (ref 8.4–10.2)
CHLORIDE SERPL-SCNC: 101 MMOL/L (ref 98–107)
CO2 SERPL-SCNC: 27 MMOL/L (ref 22–29)
CREAT SERPL-MCNC: 0.81 MG/DL (ref 0.55–1.02)
GFR SERPLBLD CREATININE-BSD FMLA CKD-EPI: >60 ML/MIN/1.73/M2
GLUCOSE SERPL-MCNC: 305 MG/DL (ref 70–110)
GLUCOSE SERPL-MCNC: 305 MG/DL (ref 74–100)
HBA1C MFR BLD: 11.3 %
PHOSPHATE SERPL-MCNC: 3.4 MG/DL (ref 2.3–4.7)
POTASSIUM SERPL-SCNC: 3.9 MMOL/L (ref 3.5–5.1)
SODIUM SERPL-SCNC: 139 MMOL/L (ref 136–145)

## 2025-05-26 PROCEDURE — 92228 IMG RTA DETC/MNTR DS PHY/QHP: CPT | Mod: PBBFAC

## 2025-05-26 PROCEDURE — 3075F SYST BP GE 130 - 139MM HG: CPT | Mod: CPTII,,, | Performed by: NURSE PRACTITIONER

## 2025-05-26 PROCEDURE — 82962 GLUCOSE BLOOD TEST: CPT | Mod: PBBFAC | Performed by: NURSE PRACTITIONER

## 2025-05-26 PROCEDURE — 83036 HEMOGLOBIN GLYCOSYLATED A1C: CPT | Mod: PBBFAC | Performed by: NURSE PRACTITIONER

## 2025-05-26 PROCEDURE — 2024F 7 FLD RTA PHOTO EVC RTNOPTHY: CPT | Mod: CPTII,,, | Performed by: OPTOMETRIST

## 2025-05-26 PROCEDURE — 99204 OFFICE O/P NEW MOD 45 MIN: CPT | Mod: S$PBB,,, | Performed by: NURSE PRACTITIONER

## 2025-05-26 PROCEDURE — 86341 ISLET CELL ANTIBODY: CPT

## 2025-05-26 PROCEDURE — 1160F RVW MEDS BY RX/DR IN RCRD: CPT | Mod: CPTII,,, | Performed by: NURSE PRACTITIONER

## 2025-05-26 PROCEDURE — 99215 OFFICE O/P EST HI 40 MIN: CPT | Mod: PBBFAC,25 | Performed by: NURSE PRACTITIONER

## 2025-05-26 PROCEDURE — 92228 IMG RTA DETC/MNTR DS PHY/QHP: CPT | Mod: 26,S$PBB,, | Performed by: OPTOMETRIST

## 2025-05-26 PROCEDURE — 3079F DIAST BP 80-89 MM HG: CPT | Mod: CPTII,,, | Performed by: NURSE PRACTITIONER

## 2025-05-26 PROCEDURE — 36415 COLL VENOUS BLD VENIPUNCTURE: CPT

## 2025-05-26 PROCEDURE — 1159F MED LIST DOCD IN RCRD: CPT | Mod: CPTII,,, | Performed by: NURSE PRACTITIONER

## 2025-05-26 PROCEDURE — 84681 ASSAY OF C-PEPTIDE: CPT

## 2025-05-26 PROCEDURE — 3046F HEMOGLOBIN A1C LEVEL >9.0%: CPT | Mod: CPTII,,, | Performed by: NURSE PRACTITIONER

## 2025-05-26 PROCEDURE — 3008F BODY MASS INDEX DOCD: CPT | Mod: CPTII,,, | Performed by: NURSE PRACTITIONER

## 2025-05-26 PROCEDURE — 80069 RENAL FUNCTION PANEL: CPT

## 2025-05-26 RX ORDER — BLOOD-GLUCOSE,RECEIVER,CONT
EACH MISCELLANEOUS
Qty: 1 EACH | Refills: 0 | Status: SHIPPED | OUTPATIENT
Start: 2025-05-26

## 2025-05-26 RX ORDER — ROSUVASTATIN CALCIUM 5 MG/1
5 TABLET, COATED ORAL DAILY
COMMUNITY
Start: 2025-04-21

## 2025-05-26 RX ORDER — BLOOD-GLUCOSE SENSOR
EACH MISCELLANEOUS
Qty: 3 EACH | Refills: 11 | Status: SHIPPED | OUTPATIENT
Start: 2025-05-26

## 2025-05-26 RX ORDER — AMLODIPINE BESYLATE 10 MG
10 TABLET ORAL DAILY
COMMUNITY

## 2025-05-26 RX ORDER — PEN NEEDLE, DIABETIC 32GX 5/32"
NEEDLE, DISPOSABLE MISCELLANEOUS
COMMUNITY
Start: 2025-04-16

## 2025-05-26 NOTE — PROGRESS NOTES
Please notify patient regarding diabetic eye exam and lab results  Bilateral Mild nonproliferative diabetic retinopathy. no diabetic macular edema .  Referral to Ophthalmology initiated.  Kidney functions, electrolytes within normal range.  Will notify with the rest of blood work when it becomes available.  Thank you have a great day

## 2025-05-26 NOTE — PROGRESS NOTES
Patient Name: Silvia Rizzo   : 1970  MRN: [id number]     SUBJECTIVE DATA:    CHIEF COMPLAINT:   Silvia Rizzo is a 55 y.o. female who presents to clinic today with Referral-DM        HPI:  55-year-old female presents to endocrinology clinic to establish care and for management of diabetes.    Patient has been diagnosed with diabetes since     Current diabetes medications:  Insulin Lantus 15 units subQ daily.    Current  mg/dL    Patient state blood glucose below 130 she feels weak.  Patient endorses compliant with medication.  Patient reports tingling and numbness to hands and feet      Care plan:  Hemoglobin A1c blood goal is less than 7%, current hemoglobin A1c at 11.3%  Continue Lantus 15 units daily  Rx Jardiance 25 mg p.o. once daily.  Educated patient fasting blood glucose goal should be between 130 - 140  Blood glucose 2 hours after eating should be less than 200  Start exercising at least 30 minutes a day up to 5 days a week as simple as brisk walking.    Educated patient to keep carbohydrate intake no more than 50 g per meal and snacks should be less than 15 g per snack but no more than 2 snacks a day  Educated on serving size and reading food labels  Patient agreed to start Dexcom G7   Stay hydrated with water.  Avoid sugary drinks.  Get plenty of sleep at least 7-8 hours a night.    Follow low-fat, low-cholesterol diet   Keep fiber intake between 25-30 g per day  Patient to read discharge education materials.  Patient agreed to be referred to diabetes education.  Patient to return to clinic with Dexcom G7 to be applied and educated on use.  Patient to return to clinic on 2025 for follow-up on diabetes and medication adjustment if needed  Labs today, C-peptide, gas 65, renal function panel, microalbumin, diabetic eye photo  Ophthalmology referral initiated.  Questions solicited and answered, patient verbalized understanding and agreed to plan of care        Patient denies chest  "pain, shortness of breath, dyspnea on exertion, palpitations, peripheral edema, abdominal pain, nausea, vomiting, diarrhea, constipation, fatigue, fever, chills, dysuria,  hematuria, dark stools or bloody stools        ALLERGIES:   Review of patient's allergies indicates:   Allergen Reactions    Buspirone     Carbamazepine     Codeine      Other reaction(s): hives         ROS:  Review of Systems   All other systems reviewed and are negative.        OBJECTIVE DATA:  Vital signs  Vitals:    05/26/25 1015   BP: 134/80   BP Location: Left arm   Patient Position: Sitting   Pulse: 69   Resp: 14   Temp: 97.9 °F (36.6 °C)   TempSrc: Oral   Weight: 67 kg (147 lb 11.3 oz)   Height: 5' 4" (1.626 m)      Body mass index is 25.35 kg/m².    PHYSICAL EXAM:   Physical Exam  Vitals and nursing note reviewed.   Constitutional:       General: She is awake. She is not in acute distress.     Appearance: Normal appearance. She is well-developed, well-groomed and overweight. She is not ill-appearing, toxic-appearing or diaphoretic.   HENT:      Head: Normocephalic and atraumatic.      Right Ear: External ear normal.      Left Ear: External ear normal.      Nose: Nose normal.      Mouth/Throat:      Lips: Pink.      Mouth: Mucous membranes are moist.   Eyes:      General: Lids are normal. Gaze aligned appropriately.      Extraocular Movements: Extraocular movements intact.      Pupils: Pupils are equal, round, and reactive to light.   Neck:      Thyroid: No thyroid mass, thyromegaly or thyroid tenderness.      Trachea: Trachea and phonation normal.   Cardiovascular:      Rate and Rhythm: Normal rate and regular rhythm.      Pulses: Normal pulses.           Radial pulses are 2+ on the right side and 2+ on the left side.      Heart sounds: Normal heart sounds.   Pulmonary:      Effort: Pulmonary effort is normal.      Breath sounds: Normal breath sounds and air entry.   Abdominal:      General: Abdomen is flat.   Musculoskeletal:         " General: Normal range of motion.      Cervical back: Full passive range of motion without pain, normal range of motion and neck supple.   Lymphadenopathy:      Cervical: No cervical adenopathy.   Skin:     General: Skin is warm and dry.      Capillary Refill: Capillary refill takes less than 2 seconds.   Neurological:      General: No focal deficit present.      Mental Status: She is alert and oriented to person, place, and time. Mental status is at baseline.      GCS: GCS eye subscore is 4. GCS verbal subscore is 5. GCS motor subscore is 6.      Cranial Nerves: No cranial nerve deficit.      Sensory: No sensory deficit.      Motor: No weakness.      Coordination: Coordination normal.      Gait: Gait normal.   Psychiatric:         Mood and Affect: Mood normal.         Behavior: Behavior normal. Behavior is cooperative.         Thought Content: Thought content normal.         Judgment: Judgment normal.          ASSESSMENT/PLAN:  1. Type 2 diabetes mellitus with hyperglycemia, with long-term current use of insulin  Assessment & Plan:  Hemoglobin A1c blood goal is less than 7%, current hemoglobin A1c at 11.3%  Continue Lantus 15 units daily  Rx Jardiance 25 mg p.o. once daily.  Educated patient fasting blood glucose goal should be between 130 - 140  Blood glucose 2 hours after eating should be less than 200  Start exercising at least 30 minutes a day up to 5 days a week as simple as brisk walking.    Educated patient to keep carbohydrate intake no more than 50 g per meal and snacks should be less than 15 g per snack but no more than 2 snacks a day  Educated on serving size and reading food labels  Patient agreed to start Dexcom G7   Stay hydrated with water.  Avoid sugary drinks.  Get plenty of sleep at least 7-8 hours a night.    Follow low-fat, low-cholesterol diet   Keep fiber intake between 25-30 g per day  Patient to read discharge education materials.  Patient agreed to be referred to diabetes education.  Patient  to return to clinic with Dexcom G7 to be applied and educated on use.  Patient to return to clinic on June 6, 2025 for follow-up on diabetes and medication adjustment if needed  Labs today, C-peptide, gas 65, renal function panel, microalbumin, diabetic eye photo  Questions solicited and answered, patient verbalized understanding and agreed to plan of care      Orders:  -     Renal Function Panel; Future; Expected date: 05/26/2025  -     C-Peptide; Future; Expected date: 05/26/2025  -     Glutamic Acid Decarboxylase; Future; Expected date: 05/26/2025  -     POCT Glucose, Hand-Held Device  -     Hemoglobin A1C, POCT  -     Microalbumin/Creatinine Ratio, Urine  -     empagliflozin (JARDIANCE) 25 mg tablet; Take 1 tablet (25 mg total) by mouth once daily.  Dispense: 30 tablet; Refill: 6  -     Ambulatory referral/consult to Diabetes Education; Future; Expected date: 06/02/2025  -     blood-glucose sensor (DEXCOM G7 SENSOR) Zoey; Change every 10 days  Dispense: 3 each; Refill: 11  -     blood-glucose,,cont (DEXCOM G7 ) Misc; Dexcom G7  uses directed  Dispense: 1 each; Refill: 0  -     Ambulatory referral/consult to Ophthalmology    2. Mild nonproliferative diabetic retinopathy of both eyes without macular edema associated with type 2 diabetes mellitus  Assessment & Plan:  Ophthalmology referral initiated.      3. Type 2 diabetes mellitus with other specified complication, unspecified whether long term insulin use  -     Ambulatory referral/consult to Endocrinology           RESULTS:  Recent Results (from the past 6 weeks)   POCT Glucose, Hand-Held Device    Collection Time: 05/26/25 10:23 AM   Result Value Ref Range    POC Glucose 305 (A) 70 - 110 MG/DL   Hemoglobin A1C, POCT    Collection Time: 05/26/25 10:24 AM   Result Value Ref Range    Hemoglobin A1C, POC 11.3 (A) %   Renal Function Panel    Collection Time: 05/26/25 11:23 AM   Result Value Ref Range    Sodium 139 136 - 145 mmol/L     Potassium 3.9 3.5 - 5.1 mmol/L    Chloride 101 98 - 107 mmol/L    CO2 27 22 - 29 mmol/L    Glucose 305 (H) 74 - 100 mg/dL    Blood Urea Nitrogen 10.1 9.8 - 20.1 mg/dL    Creatinine 0.81 0.55 - 1.02 mg/dL    Calcium 9.8 8.4 - 10.2 mg/dL    Albumin 4.5 3.5 - 5.0 g/dL    Phosphorus Level 3.4 2.3 - 4.7 mg/dL    eGFR >60 mL/min/1.73/m2         Follow Up:  Follow up in about 16 days (around 6/11/2025).     45 minutes of total time spent on the encounter, which includes face to face time and non-face to face time preparing to see the patient (eg, review of tests), Obtaining and/or reviewing separately obtained history, Documenting clinical information in the electronic or other health record, Independently interpreting results (not separately reported) and communicating results to the patient/family/caregiver, or Care coordination (not separately reported).      Previous medical history/lab work/radiology reviewed and considered during medical management decisions.   Medication list reviewed and medication reconciliation performed.  Patient was provided  and care about his/her current diagnosis (es) and medications including risk/benefit and side effects/adverse events, over the counter medication uses/doses, home self-care and contact precautions,  and red flags and indications for when to seek immediate medical attention.   Patient was advised to continue compliance with current medication list and medical recommendations.  Patient dvised continued compliance with recommended eating habits/ diets for medical conditions and exercise 150 minutes/ week (if possible) for medical condition (s).  Educational handouts and instructions on selected disease management in AVS (After Visit Summary).    All of the patient's questions were answered to patient's satisfaction.   The patient was receptive, expressed verbal understanding and agreement the above plan.        This note was created with the assistance of a voice  recognition software or phone dictation. There may be transcription errors as a result of using this technology however minimal. Effort has been made to assure accuracy of transcription but any obvious errors or omissions should be clarified with the author of the document

## 2025-05-26 NOTE — ASSESSMENT & PLAN NOTE
Hemoglobin A1c blood goal is less than 7%, current hemoglobin A1c at 11.3%  Continue Lantus 15 units daily  Rx Jardiance 25 mg p.o. once daily.  Educated patient fasting blood glucose goal should be between 130 - 140  Blood glucose 2 hours after eating should be less than 200  Start exercising at least 30 minutes a day up to 5 days a week as simple as brisk walking.    Educated patient to keep carbohydrate intake no more than 50 g per meal and snacks should be less than 15 g per snack but no more than 2 snacks a day  Educated on serving size and reading food labels  Patient agreed to start Dexcom G7   Stay hydrated with water.  Avoid sugary drinks.  Get plenty of sleep at least 7-8 hours a night.    Follow low-fat, low-cholesterol diet   Keep fiber intake between 25-30 g per day  Patient to read discharge education materials.  Patient agreed to be referred to diabetes education.  Patient to return to clinic with Dexcom G7 to be applied and educated on use.  Patient to return to clinic on June 6, 2025 for follow-up on diabetes and medication adjustment if needed  Labs today, C-peptide, gas 65, renal function panel, microalbumin, diabetic eye photo  Questions solicited and answered, patient verbalized understanding and agreed to plan of care

## 2025-05-26 NOTE — PROGRESS NOTES
Silvia Rizzo is a 55 y.o. female here for a diabetic eye screening with non-dilated fundus photos per     Patient cooperative?: Yes  Small pupils?: No  Last eye exam: unknown     For exam results, see Encounter Report.

## 2025-05-28 LAB — C PEPTIDE P FAST SERPL-MCNC: 0.8 NG/ML (ref 1.1–4.4)

## 2025-05-29 ENCOUNTER — CLINICAL SUPPORT (OUTPATIENT)
Dept: ENDOCRINOLOGY | Facility: CLINIC | Age: 55
End: 2025-05-29
Payer: MEDICAID

## 2025-05-29 DIAGNOSIS — E11.65 TYPE 2 DIABETES MELLITUS WITH HYPERGLYCEMIA, WITH LONG-TERM CURRENT USE OF INSULIN: Primary | ICD-10-CM

## 2025-05-29 DIAGNOSIS — Z79.4 TYPE 2 DIABETES MELLITUS WITH HYPERGLYCEMIA, WITH LONG-TERM CURRENT USE OF INSULIN: Primary | ICD-10-CM

## 2025-05-29 NOTE — PROGRESS NOTES
Assisted Ms. Rizzo with applying a Dexcom sensor and setting-up her .     Educated patient on the use of Dexcom equipment.  Instructed pt to call with any questions or problems. Pt verbalized understanding.

## 2025-05-30 LAB — GAD65 AB SER-SCNC: 1.91 NMOL/L

## 2025-06-03 ENCOUNTER — TELEPHONE (OUTPATIENT)
Dept: ENDOCRINOLOGY | Facility: CLINIC | Age: 55
End: 2025-06-03
Payer: MEDICAID

## 2025-06-03 ENCOUNTER — RESULTS FOLLOW-UP (OUTPATIENT)
Dept: ENDOCRINOLOGY | Facility: CLINIC | Age: 55
End: 2025-06-03

## 2025-06-03 NOTE — PROGRESS NOTES
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   Let us get diabetes under control.  Ask patient if she picked up Dexcom G7 and applied it at home or does she need help with dex, application.  We need Dexcom G7 started before her next appointment on June 11th.  Please and thank you